# Patient Record
Sex: FEMALE | Race: WHITE | NOT HISPANIC OR LATINO | Employment: FULL TIME | URBAN - METROPOLITAN AREA
[De-identification: names, ages, dates, MRNs, and addresses within clinical notes are randomized per-mention and may not be internally consistent; named-entity substitution may affect disease eponyms.]

---

## 2018-02-14 ASSESSMENT — MIFFLIN-ST. JEOR: SCORE: 1462.08

## 2018-02-15 ASSESSMENT — MIFFLIN-ST. JEOR: SCORE: 1502.91

## 2018-02-19 ENCOUNTER — ANESTHESIA - HEALTHEAST (OUTPATIENT)
Dept: SURGERY | Facility: CLINIC | Age: 68
End: 2018-02-19

## 2018-02-20 ENCOUNTER — SURGERY - HEALTHEAST (OUTPATIENT)
Dept: SURGERY | Facility: CLINIC | Age: 68
End: 2018-02-20

## 2018-02-20 ENCOUNTER — HOME CARE/HOSPICE - HEALTHEAST (OUTPATIENT)
Dept: HOME HEALTH SERVICES | Facility: HOME HEALTH | Age: 68
End: 2018-02-20

## 2018-02-21 ASSESSMENT — MIFFLIN-ST. JEOR: SCORE: 1502.91

## 2019-02-05 ENCOUNTER — AMBULATORY - HEALTHEAST (OUTPATIENT)
Dept: MULTI SPECIALTY CLINIC | Facility: CLINIC | Age: 69
End: 2019-02-05

## 2019-02-05 LAB — HBA1C MFR BLD: 5.8 % (ref 0–5.6)

## 2019-02-06 ENCOUNTER — RECORDS - HEALTHEAST (OUTPATIENT)
Dept: ADMINISTRATIVE | Facility: OTHER | Age: 69
End: 2019-02-06

## 2019-02-21 ASSESSMENT — MIFFLIN-ST. JEOR: SCORE: 1471.16

## 2019-02-25 ENCOUNTER — ANESTHESIA - HEALTHEAST (OUTPATIENT)
Dept: SURGERY | Facility: CLINIC | Age: 69
End: 2019-02-25

## 2019-02-26 ENCOUNTER — SURGERY - HEALTHEAST (OUTPATIENT)
Dept: SURGERY | Facility: CLINIC | Age: 69
End: 2019-02-26

## 2019-02-26 ASSESSMENT — MIFFLIN-ST. JEOR
SCORE: 1453.01
SCORE: 1453.01

## 2021-06-01 VITALS — HEIGHT: 70 IN | BODY MASS INDEX: 28.49 KG/M2 | WEIGHT: 199 LBS

## 2021-06-02 VITALS — BODY MASS INDEX: 29.55 KG/M2 | WEIGHT: 195 LBS | HEIGHT: 68 IN

## 2021-06-16 PROBLEM — M17.9 KNEE OSTEOARTHRITIS: Status: ACTIVE | Noted: 2018-02-20

## 2021-06-16 PROBLEM — Z96.612 STATUS POST TOTAL SHOULDER ARTHROPLASTY, LEFT: Status: ACTIVE | Noted: 2019-02-26

## 2021-06-16 NOTE — ANESTHESIA CARE TRANSFER NOTE
Last vitals:   Vitals:    02/20/18 0854   BP: 122/62   Pulse: 80   Resp: 16   Temp: 36.3  C (97.4  F)   SpO2: 100%     Patient's level of consciousness is drowsy  Spontaneous respirations: yes  Maintains airway independently: yes  Dentition unchanged: yes  Oropharynx: oropharynx clear of all foreign objects    QCDR Measures:  ASA# 20 - Surgical Safety Checklist: WHO surgical safety checklist completed prior to induction  PQRS# 430 - Adult PONV Prevention: 4558F - Pt received => 2 anti-emetic agents (different classes) preop & intraop  ASA# 8 - Peds PONV Prevention: NA - Not pediatric patient, not GA or 2 or more risk factors NOT present  PQRS# 424 - [place]-op Temp Management: 4559F - At least one body temp DOCUMENTED => 35.5C or 95.9F within required timeframe  PQRS# 426 - PACU Transfer Protocol: - Transfer of care checklist used  ASA# 14 - Acute Post-op Pain: ASA14B - Patient did NOT experience pain >= 7 out of 10

## 2021-06-16 NOTE — ANESTHESIA PROCEDURE NOTES
Peripheral Block    Patient location during procedure: pre-op  Start time: 2/20/2018 6:54 AM  End time: 2/20/2018 6:55 AM  post-op analgesia per surgeon order as noted in medical record  Staffing:  Performing  Anesthesiologist: WILFREDO BISWAS IV  Preanesthetic Checklist  Completed: patient identified, site marked, risks, benefits, and alternatives discussed, timeout performed, consent obtained, at patient's request, airway assessed, oxygen available, suction available, emergency drugs available and hand hygiene performed  Peripheral Block  Block type: saphenous  Prep: ChloraPrep  Patient position: supine  Patient monitoring: cardiac monitor, continuous pulse oximetry, heart rate and blood pressure  Laterality: right  Injection technique: ultrasound guided          lidocaine 1% local anesthesia used for local skin prep  Needle  Needle type: echogenic   Needle gauge: 21 G  Needle length: 4 in  no peripheral nerve catheter placed  Assessment  Injection assessment: no difficulty with injection, negative aspiration for heme, no paresthesia on injection and incremental injection

## 2021-06-16 NOTE — ANESTHESIA POSTPROCEDURE EVALUATION
Patient: Stella Guidry  #1 RIGHT TOTAL KNEE ARTHROPLASTY  Anesthesia type: spinal    Patient location: PACU  Last vitals:   Vitals:    02/20/18 1300   BP: 116/68   Pulse: 80   Resp: 16   Temp: 36.6  C (97.9  F)   SpO2: 92%     Post vital signs: stable  Level of consciousness: awake and responds to simple questions  Post-anesthesia pain: pain controlled  Post-anesthesia nausea and vomiting: no  Pulmonary: unassisted, return to baseline  Cardiovascular: stable and blood pressure at baseline  Hydration: adequate  Anesthetic events: no    QCDR Measures:  ASA# 11 - [place]-op Cardiac Arrest: ASA11B - Patient did NOT experience unanticipated cardiac arrest  ASA# 12 - [place]-op Mortality Rate: ASA12B - Patient did NOT die  ASA# 13 - PACU Re-Intubation Rate: ASA13B - Patient did NOT require a new airway mgmt  ASA# 10 - Composite Anes Safety: ASA10A - No serious adverse event    Additional Notes:

## 2021-06-16 NOTE — ANESTHESIA PROCEDURE NOTES
Spinal Block    Patient location during procedure: OR  Start time: 2/20/2018 7:20 AM  End time: 2/20/2018 7:22 AM  Reason for block: primary anesthetic    Staffing:  Performing  Anesthesiologist: WILFREDO BISWAS IV    Preanesthetic Checklist  Completed: patient identified, risks, benefits, and alternatives discussed, timeout performed, consent obtained, at patient's request, airway assessed, oxygen available, suction available, emergency drugs available and hand hygiene performed  Spinal Block  Patient position: sitting  Prep: ChloraPrep  Patient monitoring: heart rate, cardiac monitor, continuous pulse ox and blood pressure  Approach: right paramedian  Location: L4-5  Injection technique: single-shot  Needle type: Quincke   Needle gauge: 25 G

## 2021-06-16 NOTE — ANESTHESIA PROCEDURE NOTES
Peripheral Block    Patient location during procedure: pre-op  Start time: 2/20/2018 6:50 AM  End time: 2/20/2018 6:53 AM  post-op analgesia per surgeon order as noted in medical record  Staffing:  Performing  Anesthesiologist: WILFREDO BISWAS IV  Preanesthetic Checklist  Completed: patient identified, site marked, risks, benefits, and alternatives discussed, timeout performed, consent obtained, at patient's request, airway assessed, oxygen available, suction available, emergency drugs available and hand hygiene performed  Peripheral Block  Block type: brachial plexus  Prep: ChloraPrep  Patient position: left lateral decubitus  Patient monitoring: cardiac monitor, continuous pulse oximetry, heart rate and blood pressure  Laterality: right  Injection technique: ultrasound guided          lidocaine 1% local anesthesia used for local skin prep  Needle  Needle type: echogenic   Needle gauge: 21 G  Needle length: 4 in  no peripheral nerve catheter placed  Assessment  Injection assessment: negative aspiration for heme, no paresthesia on injection, incremental injection and no difficulty with injection

## 2021-06-24 NOTE — ANESTHESIA POSTPROCEDURE EVALUATION
Patient: Stella Guidry  LEFT TOTAL SHOULDER ARTHROPLASTY  Anesthesia type: general    Patient location: PACU  Last vitals:   Vitals:    02/26/19 1150   BP: 143/89   Pulse: 90   Resp: 16   Temp: 36.8  C (98.3  F)   SpO2: 93%     Post vital signs: stable  Level of consciousness: responds to simple questions, responds to stimulation and sleepy  Post-anesthesia pain: pain controlled  Post-anesthesia nausea and vomiting: no  Pulmonary: unassisted, spontaneous ventilation, nasal cannula  Cardiovascular: stable and blood pressure at baseline  Hydration: adequate  Anesthetic events: no    QCDR Measures:  ASA# 11 - [place]-op Cardiac Arrest: ASA11B - Patient did NOT experience unanticipated cardiac arrest  ASA# 12 - [place]-op Mortality Rate: ASA12B - Patient did NOT die  ASA# 13 - PACU Re-Intubation Rate: ASA13B - Patient did NOT require a new airway mgmt  ASA# 10 - Composite Anes Safety: ASA10A - No serious adverse event    Additional Notes: doing well, no pain due to good block

## 2021-06-24 NOTE — ANESTHESIA PROCEDURE NOTES
Peripheral Block    Patient location during procedure: pre-op  Start time: 2/26/2019 7:30 AM  End time: 2/26/2019 7:36 AM  post-op analgesia per surgeon order as noted in medical record  Staffing:  Performing  Anesthesiologist: Boby De Oliveira MD  Preanesthetic Checklist  Completed: patient identified, site marked, risks, benefits, and alternatives discussed, timeout performed, consent obtained, at patient's request, airway assessed, oxygen available, suction available, emergency drugs available and hand hygiene performed  Peripheral Block  Block type: brachial plexus, interscalene  Prep: ChloraPrep  Patient position: supine  Patient monitoring: cardiac monitor, continuous pulse oximetry, heart rate and blood pressure  Laterality: left  Injection technique: ultrasound guided    Ultrasound used to visualize needle placement in proximity to nerve being blocked: yes   Permanent ultrasound image captured for medical record  Sterile gel and probe cover used for ultrasound.    Needle  Needle type: Stimuplex   Needle gauge: 21 G  Needle length: 4 in  no peripheral nerve catheter placed  Assessment  Injection assessment: negative aspiration for heme, no paresthesia on injection, incremental injection and no difficulty with injection

## 2021-06-24 NOTE — ANESTHESIA PREPROCEDURE EVALUATION
Anesthesia Evaluation      Patient summary reviewed   No history of anesthetic complications     Airway   Mallampati: II  Neck ROM: full   Pulmonary - normal exam   (-) COPD, sleep apnea                         Cardiovascular - negative ROS and normal exam  Exercise tolerance: > or = 4 METS  (+) hypertension well controlled, , hypercholesterolemia,     (-) past MI, CAD, CABG/stent, CHF   Neuro/Psych - negative ROS     Endo/Other    (+) diabetes mellitus type 2 well controlled, arthritis, obesity,      GI/Hepatic/Renal    (+) GERD,             Dental    (+) poor dentition                         Anesthesia Plan  Planned anesthetic: peripheral nerve block and general endotracheal  ISB for POP per surgeon request.  Decadron, Zofran.  Diprivan infusion.  Tylenol, Toradol PRN.  ASA 2   Induction: intravenous   Anesthetic plan and risks discussed with: patient  Anesthesia plan special considerations: antiemetics,   Post-op plan: routine recovery      Normal CBC and Chem-8

## 2021-06-24 NOTE — ANESTHESIA CARE TRANSFER NOTE
Last vitals:   Vitals:    02/26/19 1047   BP: 136/71   Pulse: 97   Resp: 16   Temp: 37.2  C (98.9  F)   SpO2: 97%     Patient's level of consciousness is awake and drowsy  Spontaneous respirations: yes  Maintains airway independently: yes  Dentition unchanged: yes  Oropharynx: oropharynx clear of all foreign objects    QCDR Measures:  ASA# 20 - Surgical Safety Checklist: WHO surgical safety checklist completed prior to induction    PQRS# 430 - Adult PONV Prevention: 4558F - Pt received => 2 anti-emetic agents (different classes) preop & intraop  ASA# 8 - Peds PONV Prevention: NA - Not pediatric patient, not GA or 2 or more risk factors NOT present  PQRS# 424 - [place]-op Temp Management: 4559F - At least one body temp DOCUMENTED => 35.5C or 95.9F within required timeframe  PQRS# 426 - PACU Transfer Protocol: - Transfer of care checklist used  ASA# 14 - Acute Post-op Pain: ASA14B - Patient did NOT experience pain >= 7 out of 10

## 2021-06-24 NOTE — ANESTHESIA PROCEDURE NOTES
Peripheral Block    Patient location during procedure: pre-op  Start time: 2/26/2019 7:31 AM  End time: 2/26/2019 7:36 AM  post-op analgesia per surgeon order as noted in medical record  Staffing:  Performing  Anesthesiologist: Boby De Oliveira MD  Preanesthetic Checklist  Completed: patient identified, site marked, risks, benefits, and alternatives discussed, timeout performed, consent obtained, at patient's request, airway assessed, oxygen available, suction available, emergency drugs available and hand hygiene performed  Peripheral Block  Block type: other, superficial cervical plexus  Prep: ChloraPrep  Patient position: supine  Patient monitoring: cardiac monitor, continuous pulse oximetry, blood pressure and heart rate  Laterality: left  Injection technique: ultrasound guided    Ultrasound used to visualize needle placement in proximity to nerve being blocked: yes   Permanent ultrasound image captured for medical record  Sterile gel and probe cover used for ultrasound.    Needle  Needle type: Stimuplex   Needle gauge: 21 G  Needle length: 4 in  no peripheral nerve catheter placed  Assessment  Injection assessment: negative aspiration for heme, no difficulty with injection, no paresthesia on injection and incremental injection

## 2021-12-17 DIAGNOSIS — Z11.59 ENCOUNTER FOR SCREENING FOR OTHER VIRAL DISEASES: ICD-10-CM

## 2022-01-10 ENCOUNTER — LAB (OUTPATIENT)
Dept: LAB | Facility: CLINIC | Age: 72
End: 2022-01-10
Attending: ORTHOPAEDIC SURGERY
Payer: MEDICARE

## 2022-01-10 DIAGNOSIS — Z11.59 ENCOUNTER FOR SCREENING FOR OTHER VIRAL DISEASES: ICD-10-CM

## 2022-01-10 PROCEDURE — U0003 INFECTIOUS AGENT DETECTION BY NUCLEIC ACID (DNA OR RNA); SEVERE ACUTE RESPIRATORY SYNDROME CORONAVIRUS 2 (SARS-COV-2) (CORONAVIRUS DISEASE [COVID-19]), AMPLIFIED PROBE TECHNIQUE, MAKING USE OF HIGH THROUGHPUT TECHNOLOGIES AS DESCRIBED BY CMS-2020-01-R: HCPCS

## 2022-01-10 PROCEDURE — U0005 INFEC AGEN DETEC AMPLI PROBE: HCPCS

## 2022-01-11 ENCOUNTER — ANESTHESIA EVENT (OUTPATIENT)
Dept: SURGERY | Facility: CLINIC | Age: 72
End: 2022-01-11
Payer: MEDICARE

## 2022-01-11 LAB — SARS-COV-2 RNA RESP QL NAA+PROBE: NEGATIVE

## 2022-01-12 ENCOUNTER — HOSPITAL ENCOUNTER (OUTPATIENT)
Facility: CLINIC | Age: 72
Discharge: HOME | End: 2022-01-12
Attending: ORTHOPAEDIC SURGERY | Admitting: ORTHOPAEDIC SURGERY
Payer: MEDICARE

## 2022-01-12 ENCOUNTER — APPOINTMENT (OUTPATIENT)
Dept: RADIOLOGY | Facility: CLINIC | Age: 72
End: 2022-01-12
Attending: PHYSICIAN ASSISTANT
Payer: MEDICARE

## 2022-01-12 ENCOUNTER — ANESTHESIA (OUTPATIENT)
Dept: SURGERY | Facility: CLINIC | Age: 72
End: 2022-01-12
Payer: MEDICARE

## 2022-01-12 VITALS
WEIGHT: 200.3 LBS | SYSTOLIC BLOOD PRESSURE: 141 MMHG | RESPIRATION RATE: 16 BRPM | OXYGEN SATURATION: 95 % | HEART RATE: 84 BPM | DIASTOLIC BLOOD PRESSURE: 70 MMHG | HEIGHT: 69 IN | TEMPERATURE: 97.5 F | BODY MASS INDEX: 29.67 KG/M2

## 2022-01-12 DIAGNOSIS — Z96.611 STATUS POST TOTAL SHOULDER ARTHROPLASTY, RIGHT: Primary | ICD-10-CM

## 2022-01-12 LAB — GLUCOSE BLDC GLUCOMTR-MCNC: 118 MG/DL (ref 70–99)

## 2022-01-12 PROCEDURE — 250N000011 HC RX IP 250 OP 636: Performed by: NURSE ANESTHETIST, CERTIFIED REGISTERED

## 2022-01-12 PROCEDURE — 272N000001 HC OR GENERAL SUPPLY STERILE: Performed by: ORTHOPAEDIC SURGERY

## 2022-01-12 PROCEDURE — 258N000003 HC RX IP 258 OP 636: Performed by: NURSE ANESTHETIST, CERTIFIED REGISTERED

## 2022-01-12 PROCEDURE — 710N000010 HC RECOVERY PHASE 1, LEVEL 2, PER MIN: Performed by: ORTHOPAEDIC SURGERY

## 2022-01-12 PROCEDURE — 250N000011 HC RX IP 250 OP 636: Performed by: ANESTHESIOLOGY

## 2022-01-12 PROCEDURE — 999N000141 HC STATISTIC PRE-PROCEDURE NURSING ASSESSMENT: Performed by: ORTHOPAEDIC SURGERY

## 2022-01-12 PROCEDURE — 250N000013 HC RX MED GY IP 250 OP 250 PS 637: Performed by: PHYSICIAN ASSISTANT

## 2022-01-12 PROCEDURE — C1776 JOINT DEVICE (IMPLANTABLE): HCPCS | Performed by: ORTHOPAEDIC SURGERY

## 2022-01-12 PROCEDURE — 250N000011 HC RX IP 250 OP 636: Performed by: ORTHOPAEDIC SURGERY

## 2022-01-12 PROCEDURE — 999N000065 XR SHOULDER RIGHT PORT G/E 2 VIEWS: Mod: RT

## 2022-01-12 PROCEDURE — 360N000077 HC SURGERY LEVEL 4, PER MIN: Performed by: ORTHOPAEDIC SURGERY

## 2022-01-12 PROCEDURE — 278N000051 HC OR IMPLANT GENERAL: Performed by: ORTHOPAEDIC SURGERY

## 2022-01-12 PROCEDURE — 250N000009 HC RX 250: Performed by: NURSE ANESTHETIST, CERTIFIED REGISTERED

## 2022-01-12 PROCEDURE — 82962 GLUCOSE BLOOD TEST: CPT

## 2022-01-12 PROCEDURE — 258N000001 HC RX 258: Performed by: ORTHOPAEDIC SURGERY

## 2022-01-12 PROCEDURE — 250N000025 HC SEVOFLURANE, PER MIN: Performed by: ORTHOPAEDIC SURGERY

## 2022-01-12 PROCEDURE — 250N000011 HC RX IP 250 OP 636: Performed by: PHYSICIAN ASSISTANT

## 2022-01-12 PROCEDURE — C9290 INJ, BUPIVACAINE LIPOSOME: HCPCS | Performed by: ANESTHESIOLOGY

## 2022-01-12 PROCEDURE — 258N000003 HC RX IP 258 OP 636: Performed by: ANESTHESIOLOGY

## 2022-01-12 PROCEDURE — 710N000012 HC RECOVERY PHASE 2, PER MINUTE: Performed by: ORTHOPAEDIC SURGERY

## 2022-01-12 PROCEDURE — 250N000009 HC RX 250: Performed by: ANESTHESIOLOGY

## 2022-01-12 PROCEDURE — 370N000017 HC ANESTHESIA TECHNICAL FEE, PER MIN: Performed by: ORTHOPAEDIC SURGERY

## 2022-01-12 DEVICE — BONE CEMENT STRK SIMPLEX P SPEEDSET 6192-1-001: Type: IMPLANTABLE DEVICE | Site: SHOULDER | Status: FUNCTIONAL

## 2022-01-12 DEVICE — IMPLANTABLE DEVICE
Type: IMPLANTABLE DEVICE | Site: SHOULDER | Status: FUNCTIONAL
Brand: FLEX SHOULDER SYSTEM

## 2022-01-12 DEVICE — IMPLANTABLE DEVICE
Type: IMPLANTABLE DEVICE | Site: SHOULDER | Status: FUNCTIONAL
Brand: AEQUALIS™ ASCEND™ FLEX

## 2022-01-12 DEVICE — IMPLANTABLE DEVICE
Type: IMPLANTABLE DEVICE | Site: SHOULDER | Status: FUNCTIONAL
Brand: AEQUALIS™ PERFORM

## 2022-01-12 RX ORDER — ACETAMINOPHEN 325 MG/1
650 TABLET ORAL EVERY 4 HOURS PRN
Status: CANCELLED | OUTPATIENT
Start: 2022-01-15

## 2022-01-12 RX ORDER — BISACODYL 10 MG
10 SUPPOSITORY, RECTAL RECTAL DAILY PRN
Status: CANCELLED | OUTPATIENT
Start: 2022-01-12

## 2022-01-12 RX ORDER — OXYCODONE HYDROCHLORIDE 5 MG/1
5 TABLET ORAL EVERY 4 HOURS PRN
Refills: 0
Start: 2022-01-12

## 2022-01-12 RX ORDER — SODIUM CHLORIDE, SODIUM LACTATE, POTASSIUM CHLORIDE, CALCIUM CHLORIDE 600; 310; 30; 20 MG/100ML; MG/100ML; MG/100ML; MG/100ML
INJECTION, SOLUTION INTRAVENOUS CONTINUOUS
Status: CANCELLED | OUTPATIENT
Start: 2022-01-12

## 2022-01-12 RX ORDER — FENTANYL CITRATE 50 UG/ML
25 INJECTION, SOLUTION INTRAMUSCULAR; INTRAVENOUS EVERY 5 MIN PRN
Status: DISCONTINUED | OUTPATIENT
Start: 2022-01-12 | End: 2022-01-12 | Stop reason: HOSPADM

## 2022-01-12 RX ORDER — DEXAMETHASONE SODIUM PHOSPHATE 10 MG/ML
INJECTION, SOLUTION INTRAMUSCULAR; INTRAVENOUS PRN
Status: DISCONTINUED | OUTPATIENT
Start: 2022-01-12 | End: 2022-01-12

## 2022-01-12 RX ORDER — CEFAZOLIN SODIUM 2 G/100ML
2 INJECTION, SOLUTION INTRAVENOUS EVERY 8 HOURS
Status: DISCONTINUED | OUTPATIENT
Start: 2022-01-12 | End: 2022-01-12 | Stop reason: HOSPADM

## 2022-01-12 RX ORDER — ACETAMINOPHEN 500 MG
1000 TABLET ORAL EVERY 6 HOURS PRN
COMMUNITY

## 2022-01-12 RX ORDER — PROPOFOL 10 MG/ML
INJECTION, EMULSION INTRAVENOUS PRN
Status: DISCONTINUED | OUTPATIENT
Start: 2022-01-12 | End: 2022-01-12

## 2022-01-12 RX ORDER — ONDANSETRON 2 MG/ML
4 INJECTION INTRAMUSCULAR; INTRAVENOUS EVERY 6 HOURS PRN
Status: CANCELLED | OUTPATIENT
Start: 2022-01-12

## 2022-01-12 RX ORDER — ONDANSETRON 4 MG/1
4 TABLET, ORALLY DISINTEGRATING ORAL EVERY 6 HOURS PRN
Status: CANCELLED | OUTPATIENT
Start: 2022-01-12

## 2022-01-12 RX ORDER — BUPIVACAINE HYDROCHLORIDE 5 MG/ML
INJECTION, SOLUTION EPIDURAL; INTRACAUDAL
Status: COMPLETED | OUTPATIENT
Start: 2022-01-12 | End: 2022-01-12

## 2022-01-12 RX ORDER — HYDROMORPHONE HCL IN WATER/PF 6 MG/30 ML
0.4 PATIENT CONTROLLED ANALGESIA SYRINGE INTRAVENOUS
Status: CANCELLED | OUTPATIENT
Start: 2022-01-12

## 2022-01-12 RX ORDER — OXYCODONE HYDROCHLORIDE 5 MG/1
5 TABLET ORAL EVERY 4 HOURS PRN
Status: DISCONTINUED | OUTPATIENT
Start: 2022-01-12 | End: 2022-01-12 | Stop reason: HOSPADM

## 2022-01-12 RX ORDER — VANCOMYCIN HYDROCHLORIDE 1 G/20ML
INJECTION, POWDER, LYOPHILIZED, FOR SOLUTION INTRAVENOUS PRN
Status: DISCONTINUED | OUTPATIENT
Start: 2022-01-12 | End: 2022-01-12 | Stop reason: HOSPADM

## 2022-01-12 RX ORDER — ASPIRIN 81 MG/1
81 TABLET ORAL EVERY 12 HOURS
Start: 2022-01-12

## 2022-01-12 RX ORDER — ONDANSETRON 2 MG/ML
INJECTION INTRAMUSCULAR; INTRAVENOUS PRN
Status: DISCONTINUED | OUTPATIENT
Start: 2022-01-12 | End: 2022-01-12

## 2022-01-12 RX ORDER — MAGNESIUM SULFATE 4 G/50ML
4 INJECTION INTRAVENOUS ONCE
Status: COMPLETED | OUTPATIENT
Start: 2022-01-12 | End: 2022-01-12

## 2022-01-12 RX ORDER — ONDANSETRON 2 MG/ML
4 INJECTION INTRAMUSCULAR; INTRAVENOUS EVERY 30 MIN PRN
Status: DISCONTINUED | OUTPATIENT
Start: 2022-01-12 | End: 2022-01-12 | Stop reason: HOSPADM

## 2022-01-12 RX ORDER — LIDOCAINE 40 MG/G
CREAM TOPICAL
Status: CANCELLED | OUTPATIENT
Start: 2022-01-12

## 2022-01-12 RX ORDER — HYDROMORPHONE HCL IN WATER/PF 6 MG/30 ML
0.2 PATIENT CONTROLLED ANALGESIA SYRINGE INTRAVENOUS
Status: CANCELLED | OUTPATIENT
Start: 2022-01-12

## 2022-01-12 RX ORDER — POLYETHYLENE GLYCOL 3350 17 G/17G
17 POWDER, FOR SOLUTION ORAL DAILY
Status: CANCELLED | OUTPATIENT
Start: 2022-01-13

## 2022-01-12 RX ORDER — ASPIRIN 81 MG/1
81 TABLET ORAL AT BEDTIME
Status: ON HOLD | COMMUNITY
End: 2022-01-12

## 2022-01-12 RX ORDER — LIDOCAINE 40 MG/G
CREAM TOPICAL
Status: DISCONTINUED | OUTPATIENT
Start: 2022-01-12 | End: 2022-01-12 | Stop reason: HOSPADM

## 2022-01-12 RX ORDER — SODIUM CHLORIDE, SODIUM LACTATE, POTASSIUM CHLORIDE, CALCIUM CHLORIDE 600; 310; 30; 20 MG/100ML; MG/100ML; MG/100ML; MG/100ML
INJECTION, SOLUTION INTRAVENOUS CONTINUOUS
Status: DISCONTINUED | OUTPATIENT
Start: 2022-01-12 | End: 2022-01-12 | Stop reason: HOSPADM

## 2022-01-12 RX ORDER — ACETAMINOPHEN 325 MG/1
975 TABLET ORAL EVERY 8 HOURS
Status: DISCONTINUED | OUTPATIENT
Start: 2022-01-12 | End: 2022-01-12 | Stop reason: HOSPADM

## 2022-01-12 RX ORDER — HYDROMORPHONE HCL IN WATER/PF 6 MG/30 ML
0.2 PATIENT CONTROLLED ANALGESIA SYRINGE INTRAVENOUS EVERY 5 MIN PRN
Status: DISCONTINUED | OUTPATIENT
Start: 2022-01-12 | End: 2022-01-12 | Stop reason: HOSPADM

## 2022-01-12 RX ORDER — CEFAZOLIN SODIUM 2 G/100ML
2 INJECTION, SOLUTION INTRAVENOUS
Status: DISCONTINUED | OUTPATIENT
Start: 2022-01-12 | End: 2022-01-12 | Stop reason: HOSPADM

## 2022-01-12 RX ORDER — PROCHLORPERAZINE MALEATE 5 MG
5 TABLET ORAL EVERY 6 HOURS PRN
Status: CANCELLED | OUTPATIENT
Start: 2022-01-12

## 2022-01-12 RX ORDER — NITROFURANTOIN 25; 75 MG/1; MG/1
100 CAPSULE ORAL PRN
COMMUNITY
Start: 2021-08-13

## 2022-01-12 RX ORDER — ONDANSETRON 4 MG/1
4 TABLET, ORALLY DISINTEGRATING ORAL EVERY 30 MIN PRN
Status: DISCONTINUED | OUTPATIENT
Start: 2022-01-12 | End: 2022-01-12 | Stop reason: HOSPADM

## 2022-01-12 RX ORDER — PROPOFOL 10 MG/ML
INJECTION, EMULSION INTRAVENOUS CONTINUOUS PRN
Status: DISCONTINUED | OUTPATIENT
Start: 2022-01-12 | End: 2022-01-12

## 2022-01-12 RX ORDER — GLYCINE 1.5 G/100ML
SOLUTION IRRIGATION PRN
Status: DISCONTINUED | OUTPATIENT
Start: 2022-01-12 | End: 2022-01-12 | Stop reason: HOSPADM

## 2022-01-12 RX ORDER — OXYCODONE HYDROCHLORIDE 5 MG/1
10 TABLET ORAL EVERY 4 HOURS PRN
Status: DISCONTINUED | OUTPATIENT
Start: 2022-01-12 | End: 2022-01-12 | Stop reason: HOSPADM

## 2022-01-12 RX ORDER — CALCIUM CARBONATE 500(1250)
1 TABLET ORAL DAILY
COMMUNITY

## 2022-01-12 RX ORDER — AMOXICILLIN 250 MG
1 CAPSULE ORAL 2 TIMES DAILY
Status: CANCELLED | OUTPATIENT
Start: 2022-01-12

## 2022-01-12 RX ORDER — ASPIRIN 81 MG/1
81 TABLET ORAL 2 TIMES DAILY
Status: CANCELLED | OUTPATIENT
Start: 2022-01-12

## 2022-01-12 RX ORDER — CEFAZOLIN SODIUM 2 G/100ML
2 INJECTION, SOLUTION INTRAVENOUS SEE ADMIN INSTRUCTIONS
Status: DISCONTINUED | OUTPATIENT
Start: 2022-01-12 | End: 2022-01-12 | Stop reason: HOSPADM

## 2022-01-12 RX ORDER — TRANEXAMIC ACID 650 MG/1
1950 TABLET ORAL ONCE
Status: COMPLETED | OUTPATIENT
Start: 2022-01-12 | End: 2022-01-12

## 2022-01-12 RX ORDER — VANCOMYCIN HYDROCHLORIDE 1 G/20ML
1 INJECTION, POWDER, LYOPHILIZED, FOR SOLUTION INTRAVENOUS ONCE
Status: DISCONTINUED | OUTPATIENT
Start: 2022-01-12 | End: 2022-01-12 | Stop reason: HOSPADM

## 2022-01-12 RX ORDER — FENTANYL CITRATE 50 UG/ML
50 INJECTION, SOLUTION INTRAMUSCULAR; INTRAVENOUS
Status: COMPLETED | OUTPATIENT
Start: 2022-01-12 | End: 2022-01-12

## 2022-01-12 RX ORDER — CLOTRIMAZOLE 1 %
CREAM (GRAM) TOPICAL 2 TIMES DAILY PRN
COMMUNITY
Start: 2021-09-01

## 2022-01-12 RX ADMIN — BUPIVACAINE 10 ML: 13.3 INJECTION, SUSPENSION, LIPOSOMAL INFILTRATION at 06:57

## 2022-01-12 RX ADMIN — BUPIVACAINE HYDROCHLORIDE 12 ML: 5 INJECTION, SOLUTION EPIDURAL; INTRACAUDAL; PERINEURAL at 06:57

## 2022-01-12 RX ADMIN — BUPIVACAINE HYDROCHLORIDE 3 ML: 5 INJECTION, SOLUTION EPIDURAL; INTRACAUDAL at 06:59

## 2022-01-12 RX ADMIN — DEXAMETHASONE SODIUM PHOSPHATE 10 MG: 10 INJECTION, SOLUTION INTRAMUSCULAR; INTRAVENOUS at 07:31

## 2022-01-12 RX ADMIN — FENTANYL CITRATE 50 MCG: 50 INJECTION INTRAMUSCULAR; INTRAVENOUS at 06:55

## 2022-01-12 RX ADMIN — PROPOFOL 100 MCG/KG/MIN: 10 INJECTION, EMULSION INTRAVENOUS at 07:45

## 2022-01-12 RX ADMIN — PHENYLEPHRINE HYDROCHLORIDE 100 MCG: 10 INJECTION INTRAVENOUS at 07:42

## 2022-01-12 RX ADMIN — ROCURONIUM BROMIDE 50 MG: 10 INJECTION, SOLUTION INTRAVENOUS at 07:31

## 2022-01-12 RX ADMIN — FENTANYL CITRATE 100 MCG: 50 INJECTION INTRAMUSCULAR; INTRAVENOUS at 07:31

## 2022-01-12 RX ADMIN — PHENYLEPHRINE HYDROCHLORIDE 100 MCG: 10 INJECTION INTRAVENOUS at 08:03

## 2022-01-12 RX ADMIN — CEFAZOLIN SODIUM 2 G: 2 INJECTION, SOLUTION INTRAVENOUS at 07:26

## 2022-01-12 RX ADMIN — TRANEXAMIC ACID 1950 MG: 650 TABLET ORAL at 06:12

## 2022-01-12 RX ADMIN — PHENYLEPHRINE HYDROCHLORIDE 100 MCG: 10 INJECTION INTRAVENOUS at 07:48

## 2022-01-12 RX ADMIN — MAGNESIUM SULFATE HEPTAHYDRATE 4 G: 80 INJECTION, SOLUTION INTRAVENOUS at 06:20

## 2022-01-12 RX ADMIN — PHENYLEPHRINE HYDROCHLORIDE 0.5 MCG/KG/MIN: 10 INJECTION INTRAVENOUS at 08:07

## 2022-01-12 RX ADMIN — MIDAZOLAM HYDROCHLORIDE 1 MG: 1 INJECTION, SOLUTION INTRAMUSCULAR; INTRAVENOUS at 06:55

## 2022-01-12 RX ADMIN — SUGAMMADEX 200 MG: 100 INJECTION, SOLUTION INTRAVENOUS at 09:43

## 2022-01-12 RX ADMIN — ONDANSETRON 4 MG: 2 INJECTION INTRAMUSCULAR; INTRAVENOUS at 09:20

## 2022-01-12 RX ADMIN — PROPOFOL 30 MG: 10 INJECTION, EMULSION INTRAVENOUS at 09:26

## 2022-01-12 RX ADMIN — SODIUM CHLORIDE, POTASSIUM CHLORIDE, SODIUM LACTATE AND CALCIUM CHLORIDE: 600; 310; 30; 20 INJECTION, SOLUTION INTRAVENOUS at 06:13

## 2022-01-12 RX ADMIN — PROPOFOL 150 MG: 10 INJECTION, EMULSION INTRAVENOUS at 07:31

## 2022-01-12 RX ADMIN — SODIUM CHLORIDE, POTASSIUM CHLORIDE, SODIUM LACTATE AND CALCIUM CHLORIDE: 600; 310; 30; 20 INJECTION, SOLUTION INTRAVENOUS at 08:05

## 2022-01-12 RX ADMIN — PHENYLEPHRINE HYDROCHLORIDE 200 MCG: 10 INJECTION INTRAVENOUS at 08:07

## 2022-01-12 RX ADMIN — CEFAZOLIN SODIUM 2 G: 2 INJECTION, SOLUTION INTRAVENOUS at 13:11

## 2022-01-12 RX ADMIN — PHENYLEPHRINE HYDROCHLORIDE 200 MCG: 10 INJECTION INTRAVENOUS at 08:18

## 2022-01-12 ASSESSMENT — MIFFLIN-ST. JEOR: SCORE: 1492.93

## 2022-01-12 NOTE — OP NOTE
DATE OF SERVICE: 1/12/2022     PREOPERATIVE DIAGNOSIS: Osteoarthritis of right shoulder [M19.011]     POSTOPERATIVE DIAGNOSIS: Same    PROCEDURE: Procedure(s):  RIGHT TOTAL SHOULDER ARTHROPLASTY     IMPLANTS:   Implant Name Type Inv. Item Serial No.  Lot No. LRB No. Used Action   BONE CEMENT STRK SIMPLEX P SPEEDSET 6192-1-001 - [id number] Cement, Bone BONE CEMENT STRK SIMPLEX P SPEEDSET 6192-1-001  [employer] [id number] Right 1 Implanted   GLENOID CORTILOC MD 40MM - [id number] Total Joint Component/Insert GLENOID CORTILOC MD 40MM [id number] [employer]  Right 1 Implanted   STEM HUMERAL ANATOMIC STD PTC 3B - [id number] Total Joint Component/Insert STEM HUMERAL ANATOMIC STD PTC 3B [id number] [employer]  Right 1 Implanted   HEAD HUMERAL HIGH OFFSET 19E68HK - [id number] Total Joint Component/Insert HEAD HUMERAL HIGH OFFSET 27C34GI [id number] [employer]  Right 1 Implanted        FINDINGS: Severe right shoulder glenohumeral degenerative arthritis with osteophytic spurring.  Intact rotator cuff tendons.    SURGEON: MATY MONREAL MD     ASSISTANT: Lillian Park    INDICATIONS: The patient is a 71-year-old normally healthy individuals have persistent and progressively worsening right shoulder pain present for the past few years time.  She had undergone a successful left total shoulder arthroplasty and after having reviewed the risk and benefits elected to proceed with a right total shoulder arthroplasty.  The normal postop course was discussed in detail preoperatively.    PROCEDURE: After informed written consent was obtained the patient underwent successful placement of  shoulder interscalene block and then was brought to the OR where the patient underwent successful induction with general anesthesia.  The patient was carefully placed in low beachchair position taking care to properly position the head neck.  The shoulder was sterilely prepped and draped in usual fashion.  IV antibiotics were  given.    After an appropriate pause for the cause with identification of the operative limb a deltopectoral approach was made with sharp dissection being carried down through  the skin and subcutaneous tissue.  The deltopectoral interval was identified the cephalic vein was retracted and Bankart retractor was placed beneath the conjoined tendon and the deltoid.  The biceps tendon was identified.  The bicipital groove was followed up to the rotator interval and split longitudinally.  The subscapularis was then taken down after being tagged with 2 #2 fiber wires in a vertical mattress fashion.  Subscapularis takedown allowed for exposure of the severely worn glenohumeral joint.  The anterior circumflex vessels were identified ligated and transected.  The humeral head was delivered anteriorly after a capsular release was performed.  Again care was taken throughout the procedure to avoid the axillary nerve inferiorly and additional neuro- vasculature.  Once the humeral head was delivered anteriorly and and circumferential osteophytes debrided a drill hole was made just superior and posterior to the bicipital groove.  This allowed for the cutting guide to be placed without difficulty.  The cutting guide was then positioned for resection of the proximal humerus taking care to dial in the appropriate degree of version between 20 and 30 .  Once the proximal humerus was cut circumferential osteophytes were removed further.  Progressive reaming and broaching was then performed up to the appropriate sized stem.  A proximal humeral protecting disc was applied and attention then was directed towards the glenoid.    The glenoid was exposed with a Fukuda retractor placed posteriorly and a Batman retractor anteriorly.  Pointed Hohmann was placed superiorly.  After the biceps was released and a circumferential labral resection performed a Lawson was used to remove any remnant articular cartilage along the glenoid.  Hash marks were  drawn on the center of the glenoid to allow for preparation with our reamers.  After the initial center hole was drilled reaming up to a flush flat surface was performed.  Then the central hole was widened to allow for the pegged glenoid to be placed.  Appropriate size glenoid trial was applied after the center peg and 3 peripheral peg holes were drilled.  Flush fit was noted.    Once the appropriate head size was chosen with the appropriate degree of offset this was provisionally applied to the proximal humerus.  Trial reduction showed good range of motion in all planes as well as excellent stability.      Having chosen the appropriate size glenoid  copious jet lavage irrigation of the glenoid was followed by placing cement in the peripheral peg holes under compression with a syringe.  Next the glenoid was then impacted into place and held during cement hardening.    At this point the trial components were removed from the proximal humerus..  Drill holes were made in the proximal humerus at the level of the bicipital groove and 4 #2 FiberWire sutures placed for later repair of the subscapularis tendon.    After copious irrigation the appropriate size stem was then impacted into place.  Trial reduction was once again performed.  Again the appropriate head size was chosen and impacted into place.  No significant changes were noted in stability or range of motion after placement of the final components.    The shoulder was then copiously irrigated with further antibiotic solution.  Irrisept was also used to irrigate the joint.  Total volume 1 L.  One gram of vancomycin powder was placed in the wound in patients without an allergy. A meticulous repair of the subscap back to the lesser tuberosity was performed with the 4 #2 FiberWire sutures placed through the previously mentioned drill holes utilizing modified Ky-Jose stitches.  This was then reinforced with the 2 #2 FiberWire initially placed in the subscapularis  tendon.    The biceps tendon had been tenodesed to the proximal portion of the pec tendon insertion.  Further irrigation was then followed by closure of the deltopectoral interval with 0 Vicryl reinforced with 2-0 Vicryl and subcutaneous tissue 2-0 Vicryl and skin with Monocryl.  Sterile dressing was applied.  There were no known complications.  Sponge and needle counts were reported as correct ×2.  Estimated blood loss was 50 cc.  The patient was transferred to [facility] for recovery.    Adrien Isbell MD

## 2022-01-12 NOTE — ANESTHESIA POSTPROCEDURE EVALUATION
Patient: Stella Guidry    Procedure: Procedure(s):  RIGHT TOTAL SHOULDER ARTHROPLASTY       Diagnosis:Osteoarthritis of right shoulder [M19.011]  Diagnosis Additional Information: No value filed.    Anesthesia Type:  General    Note:     Postop Pain Control: Uneventful            Sign Out: Well controlled pain   PONV: No   Neuro/Psych: Uneventful            Sign Out: Acceptable/Baseline neuro status   Airway/Respiratory: Uneventful            Sign Out: Acceptable/Baseline resp. status   CV/Hemodynamics: Uneventful            Sign Out: Acceptable CV status; No obvious hypovolemia; No obvious fluid overload   Other NRE: NONE   DID A NON-ROUTINE EVENT OCCUR? No           Last vitals:  Vitals Value Taken Time   /81 01/12/22 1040   Temp 36.2  C (97.2  F) 01/12/22 1030   Pulse 71 01/12/22 1048   Resp 21 01/12/22 1048   SpO2 97 % 01/12/22 1047   Vitals shown include unvalidated device data.    Electronically Signed By: Cedric King MD  January 12, 2022  3:40 PM

## 2022-01-12 NOTE — ANESTHESIA PREPROCEDURE EVALUATION
Anesthesia Pre-Procedure Evaluation    Patient: Stella Guidry   MRN: [id number] : 1950        Preoperative Diagnosis: Osteoarthritis of right shoulder [M19.011]    Procedure : Procedure(s):  RIGHT TOTAL SHOULDER ARTHROPLASTY          Past Medical History:   Diagnosis Date     Diabetes (H)      Gastroesophageal reflux disease      Hypertension       Past Surgical History:   Procedure Laterality Date     ARTHROSCOPY KNEE       BREAST SURGERY      bx     JOINT REPLACEMENT Bilateral     knee     ZZC RECONSTR TOTAL SHOULDER IMPLANT Left 2019    Procedure: LEFT TOTAL SHOULDER ARTHROPLASTY;  Surgeon: Adrien Isbell MD;  Location: [facility];  Service: Orthopedics     ZC TOTAL KNEE ARTHROPLASTY Right 2018    Procedure: #1 RIGHT TOTAL KNEE ARTHROPLASTY;  Surgeon: Artem Gonzalez MD;  Location: [facility];  Service: Orthopedics      No Known Allergies   Social History     Tobacco Use     Smoking status: Former Smoker     Smokeless tobacco: Never Used   Substance Use Topics     Alcohol use: Yes     Alcohol/week: 4.0 standard drinks     Comment: Alcoholic Drinks/day: a week      Wt Readings from Last 1 Encounters:   22 90.9 kg (200 lb 4.8 oz)        Anesthesia Evaluation   Pt has had prior anesthetic.     No history of anesthetic complications       ROS/MED HX  ENT/Pulmonary:  - neg pulmonary ROS     Neurologic: Comment: Denies neuropathy or C-spine disease      Cardiovascular:     (+) hypertension-----    METS/Exercise Tolerance:     Hematologic:  - neg hematologic  ROS     Musculoskeletal:       GI/Hepatic:     (+) GERD (controlled),     Renal/Genitourinary:  - neg Renal ROS     Endo:     (+) type II DM,     Psychiatric/Substance Use:       Infectious Disease:       Malignancy:       Other:            Physical Exam    Airway        Mallampati: II   TM distance: > 3 FB      Respiratory Devices and Support         Dental  no notable dental history         Cardiovascular           Rhythm and rate: regular and normal     Pulmonary           breath sounds clear to auscultation           OUTSIDE LABS:  CBC:   Lab Results   Component Value Date    HGB 10.3 (L) 02/27/2019    HGB 10.2 (L) 02/21/2018     BMP:   Lab Results   Component Value Date     (H) 01/12/2022     02/27/2019     COAGS: No results found for: PTT, INR, FIBR  POC: No results found for: BGM, HCG, HCGS  HEPATIC:   Lab Results   Component Value Date    PROTTOTAL 7.1 02/26/2016     OTHER:   Lab Results   Component Value Date    A1C 6.0 02/21/2018       Anesthesia Plan    ASA Status:  2      Anesthesia Type: General.     - Airway: ETT   Induction: Intravenous.   Maintenance: TIVA.        Consents    Anesthesia Plan(s) and associated risks, benefits, and realistic alternatives discussed. Questions answered and patient/representative(s) expressed understanding.     - Discussed: Risks, Benefits and Alternatives for BOTH SEDATION and the PROCEDURE were discussed     - Discussed with:  Patient         Postoperative Care    Pain management: Peripheral nerve block (Single Shot).   PONV prophylaxis: Ondansetron (or other 5HT-3), Dexamethasone or Solumedrol     Comments:                Cedric King MD

## 2022-01-12 NOTE — ANESTHESIA PROCEDURE NOTES
Cervical Plexus (superficial) Procedure Note    Pre-Procedure   Staff -        Anesthesiologist:  Cedric King MD       Performed By: anesthesiologist       Location: pre-op       Procedure Start/Stop Times: 1/12/2022 6:57 AM and 1/12/2022 6:59 AM       Pre-Anesthestic Checklist: patient identified, IV checked, site marked, risks and benefits discussed, informed consent, monitors and equipment checked, pre-op evaluation, at physician/surgeon's request and post-op pain management  Timeout:       Correct Patient: Yes        Correct Procedure: Yes        Correct Site: Yes        Correct Position: Yes        Correct Laterality: Yes        Site Marked: Yes  Procedure Documentation  Procedure: Cervical Plexus (superficial)       Laterality: right       Patient Position: supine       Patient Prep/Sterile Barriers: sterile gloves, mask       Skin prep: Chloraprep       Needle Type: short bevel       Needle Gauge: 20.        Needle Length (Inches): 4        Ultrasound guided       1. Ultrasound was used to identify targeted nerve, plexus, vascular marker, or fascial plane and place a needle adjacent to it in real-time.       2. Ultrasound was used to visualize the spread of anesthetic in close proximity to the above referenced structure.       3. A permanent image is entered into the patient's record.       4. The visualized anatomic structures appeared normal.       5. There were no apparent abnormal pathologic findings.    Assessment/Narrative         The placement was negative for: blood aspirated, painful injection and site bleeding       Paresthesias: No.      Bolus given via needle. No blood aspirated via catheter.        Secured via.        Insertion/Infusion Method: Single Shot       Complications: none    Medication(s) Administered   Bupivacaine 0.5% PF (Infiltration), 3 mL  Medication Administration Time: 1/12/2022 6:59 AM

## 2022-01-12 NOTE — ANESTHESIA PROCEDURE NOTES
Airway       Patient location during procedure: OR       Procedure Start/Stop Times: 1/12/2022 7:37 AM  Staff -        Performed By: CRNA  Consent for Airway        Urgency: elective  Indications and Patient Condition       Indications for airway management: holly-procedural and airway protection       Induction type:intravenous       Mask difficulty assessment: 1 - vent by mask    Final Airway Details       Final airway type: endotracheal airway       Successful airway: ETT - single  Endotracheal Airway Details        ETT size (mm): 7.0       Cuffed: yes       Successful intubation technique: direct laryngoscopy       DL Blade Type: Altman 3       Adjucts: tooth guard       Position: Left       Measured from: lips       Secured at (cm): 23       Bite block used: None    Post intubation assessment        Placement verified by: capnometry and equal breath sounds        Number of attempts at approach: 1       Secured with: silk tape       Ease of procedure: easy       Dentition: Intact and Unchanged

## 2022-01-12 NOTE — ANESTHESIA CARE TRANSFER NOTE
Patient: Stella Guidry    Procedure: Procedure(s):  RIGHT TOTAL SHOULDER ARTHROPLASTY       Diagnosis: Osteoarthritis of right shoulder [M19.011]  Diagnosis Additional Information: No value filed.    Anesthesia Type:   General     Note:    Oropharynx: oropharynx clear of all foreign objects and spontaneously breathing  Level of Consciousness: drowsy  Oxygen Supplementation: face mask  Level of Supplemental Oxygen (L/min / FiO2): 8  Independent Airway: airway patency satisfactory and stable  Dentition: dentition unchanged  Vital Signs Stable: post-procedure vital signs reviewed and stable  Report to RN Given: handoff report given  Patient transferred to: PACU    Handoff Report: Set expectations for post-procedure period      Vitals:  Vitals Value Taken Time   /72 01/12/22 0955   Temp 36.8  C (98.2  F) 01/12/22 0955   Pulse 74 01/12/22 0957   Resp 15 01/12/22 0957   SpO2 96 % 01/12/22 0957   Vitals shown include unvalidated device data.    Electronically Signed By: ЮЛИЯ Mathis CRNA  January 12, 2022  9:58 AM

## 2022-01-12 NOTE — ANESTHESIA PROCEDURE NOTES
Interscalene Procedure Note    Pre-Procedure   Staff -        Anesthesiologist:  Cedric King MD       Performed By: anesthesiologist       Location: pre-op       Procedure Start/Stop Times: 1/12/2022 6:55 AM and 1/12/2022 6:57 AM       Pre-Anesthestic Checklist: patient identified, IV checked, site marked, risks and benefits discussed, informed consent, monitors and equipment checked, pre-op evaluation, at physician/surgeon's request and post-op pain management  Timeout:       Correct Patient: Yes        Correct Procedure: Yes        Correct Site: Yes        Correct Position: Yes        Correct Laterality: Yes        Site Marked: Yes  Procedure Documentation  Procedure: Interscalene       Laterality: right       Patient Position: supine       Patient Prep/Sterile Barriers: sterile gloves, mask       Skin prep: Chloraprep       Needle Type: short bevel       Needle Gauge: 20.        Needle Length (Inches): 4        Ultrasound guided       1. Ultrasound was used to identify targeted nerve, plexus, vascular marker, or fascial plane and place a needle adjacent to it in real-time.       2. Ultrasound was used to visualize the spread of anesthetic in close proximity to the above referenced structure.       3. A permanent image is entered into the patient's record.       4. The visualized anatomic structures appeared normal.       5. There were no apparent abnormal pathologic findings.    Assessment/Narrative         The placement was negative for: blood aspirated, painful injection and site bleeding       Paresthesias: No.      Bolus given via needle. No blood aspirated via catheter.        Secured via.        Insertion/Infusion Method: Single Shot       Complications: none       Injection made incrementally with aspirations every 5 mL.    Medication(s) Administered   Bupivacaine 0.5% PF (Infiltration), 12 mL  Bupivacaine liposome (Exparel) 1.3% LA inj susp (Infiltration), 10 mL  Medication Administration  Time: 1/12/2022 6:57 AM

## 2022-01-12 NOTE — PHARMACY-ADMISSION MEDICATION HISTORY
Pharmacy Note - Admission Medication History    Pertinent Provider Information: N/A   ______________________________________________________________________    Prior To Admission (PTA) med list completed and updated in EMR.       PTA Med List   Medication Sig Last Dose     acetaminophen (TYLENOL) 500 MG tablet Take 1,000 mg by mouth every 6 hours as needed for mild pain 1/11/2022 at PM     aspirin 81 MG EC tablet Take 81 mg by mouth At Bedtime 1/5/2022 at PM     atenolol (TENORMIN) 25 MG tablet [ATENOLOL (TENORMIN) 25 MG TABLET] Take 25 mg by mouth at bedtime.  1/11/2022 at PM     b complex vitamins tablet [B COMPLEX VITAMINS TABLET] Take 1 tablet by mouth daily. 1/5/2022     calcium carbonate (OS-JAYJAY) 500 MG tablet Take 1 tablet by mouth daily 1/5/2022     cholecalciferol 25 MCG (1000 UT) TABS Take 1 tablet by mouth daily 1/5/2022     clotrimazole (LOTRIMIN) 1 % external cream Apply topically 2 times daily as needed Unknown at Unknown time     coenzyme Q10 (CO Q-10) 300 mg cap [COENZYME Q10 (CO Q-10) 300 MG CAP] Take 1 capsule by mouth daily. 1/5/2022     GLUCOSAMINE/METHYLSULFONYLMETH (GLUCOSAMINE MSM ORAL) [GLUCOSAMINE/METHYLSULFONYLMETH (GLUCOSAMINE MSM ORAL)] Take 2 capsules by mouth at bedtime. 1/5/2022     losartan-hydrochlorothiazide (HYZAAR) 50-12.5 mg per tablet [LOSARTAN-HYDROCHLOROTHIAZIDE (HYZAAR) 50-12.5 MG PER TABLET] Take 1 tablet by mouth daily. 1/11/2022 at AM     metFORMIN (GLUCOPHAGE) 500 MG tablet [METFORMIN (GLUCOPHAGE) 500 MG TABLET] Take 500 mg by mouth 2 (two) times a day with meals. 1/11/2022 at PM     multivitamin therapeutic tablet [MULTIVITAMIN THERAPEUTIC TABLET] Take 1 tablet by mouth daily. 1/5/2022     nitroFURantoin macrocrystal-monohydrate (MACROBID) 100 MG capsule Take 100 mg by mouth as needed Take 30 min before sexual activity. Unknown at Unknown time     omeprazole (PRILOSEC) 20 MG capsule Take 20 mg by mouth daily  1/11/2022 at AM     oxybutynin (DITROPAN XL) 5 MG ER tablet  [OXYBUTYNIN (DITROPAN XL) 5 MG ER TABLET] Take 5 mg by mouth daily with lunch.  1/11/2022 at Unknown time     simvastatin (ZOCOR) 40 MG tablet [SIMVASTATIN (ZOCOR) 40 MG TABLET] Take 40 mg by mouth at bedtime. 1/11/2022 at PM     sulfacetamide sodium 10 % Susp [SULFACETAMIDE SODIUM 10 % SUSP] Apply 1 application topically daily as needed (to face).        Unknown at Unknown time     tretinoin (RETIN-A) 0.025 % cream [TRETINOIN (RETIN-A) 0.025 % CREAM] Apply 1 application topically at bedtime as needed.  Unknown at Unknown time     vitamin E 400 unit capsule [VITAMIN E 400 UNIT CAPSULE] Take 800 Units by mouth daily. 1/5/2022       Information source(s): Patient and Clinic records    Method of interview communication: in-person    Patient was asked about OTC/herbal products specifically.  PTA med list reflects this.    Based on the pharmacist's assessment, the PTA med list information appears reliable    Allergies were reviewed, assessed, and updated with the patient.      Patient does not anticipate needing any multi-use medications during admission.     Thank you for the opportunity to participate in the care of this patient.      Altaf Mariscal [facility]     1/12/2022     6:43 AM

## 2022-01-13 NOTE — PROGRESS NOTES
Surgeon: Called patient at [address]  [facility]: [facility]  Name of Surgery: right total shoulder arthroplasty  Date of Surgery: 1/12/2022    Hi,  I am a registerednurse calling from "[employer]" to check in and see how you are doing following your joint replacement surgery today.    1) Is your pain level manageable? If not, have you been taking your pain medications asprescribed and have you tried icing and elevating your surgical extremity?   2) Are you having any new or increased numbness or tingling in your surgical leg?   3) Are you having increased swelling around yoursurgical site?   4) Are you having any drainage from your incision?   If so, is the drainage saturating or coming through your dressing?   5) Have you been able to walk short distances around your home witha walker?   6) Have you been able to urinate since surgery?   7) If patient is a uni-compartmental-knee or total knee replacement, do they have outpatient physical therapy set up?  . If not, direct them tocontact their surgeon's office to ask if they recommend outpatient physical therapy. Please note: most total hip replacement patients do not need outpatient physical therapy unless their surgeon specifically orders it.    7) Do you have any other questions or concerns at this time? Nurse was unable to get a hold of patient.  A message was left  If patient has significant concerns after hours make sure they have appropriate after hours call number for their surgeon.

## 2025-05-14 NOTE — H&P (VIEW-ONLY)
[facility]      Preoperative Consultation   Stella Guidry   : 1950   Gender: female    Date of Encounter: 2025    Stella Guidry is a 75 y.o. female (1950) who presents for preop evaluation undergoing RIGHT LUMBAR 3-4 AND RIGHT LUMBAR 4-5 TRANSFORAMINAL LUMBAR INTERBODY FUSION WITH LAMINECTOMY WITH STEALTH NAVIGATION  for treatment of degenerative lumbar disc disease.    Date of Surgery: 6/3/25  Surgical Specialty: Clifford Caal MD  Orthopaedic Surgery  NPI: [id number]  [facility]  [facility], [place] [address]     Phone: [phone]  Fax: [phone]  Surgery type: inpatient  Primary Physician: Lisa Ba       History of Present Illness   This is a 75 yr old female with h/o HTN well controlled ,prediabetes -well controlled ,low cardiac risk going in for laminectimy-intermediate risk surgery     1:The patient is taking hypertensive medications compliantly without side effects.  Denies chest pain, dyspnea, edema, or TIA's.    2:has prediabetes on diet ,meds  HEMOGLOBIN A1C SCREENING (%)   Date Value   2024 6.0     HEMOGLOBIN A1C (% of total Hgb)   Date Value   2024 6.0 (H)       3:has had ongoing severe back pain seen at [facility] ,had shot ,PT no imp surgery was recommended    4:no fever chills  The patient denies ear pain, sore throat, nasal or sinus congestion or cough.     The patient denies dysuria, frequency or hematuria.    The patient denies abdominal or flank pain, anorexia, nausea or vomiting, dysphagia, change in bowel habits or black or bloody stools or weight loss.     Said want to fill POLST form ,want to be DNR and selective treatment     Form filled-original given to pt     History of Present Illness         Review of Systems   A comprehensive review of systems was negative except for items noted in HPI.    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     aspirin (ECOTRIN) 81 mg enteric coated tablet Take 81 mg by mouth.     atenoloL 100 mg tablet Take 1 Tablet (100 mg) by mouth once daily.     calcium carbonate-vitamin D3 500 mg-15 mcg (600 unit) tab Take 1 Tablet by mouth once daily.     CALCIUM-MAG OXIDE-VITAMIN D3 ORAL Take 2 Caps by mouth.     ciprofloxacin (Cipro) 500 mg tablet Take 1 Tablet (500 mg) by mouth two times daily before meals.     clotrimazole (LOTRIMIN) 1 % cream Apply topically to affected area(s) two times daily.     gabapentin (NEURONTIN) 300 mg capsule Take 2 Capsules (600 mg) by mouth at bedtime.     Glucosamine HCl-MSM 1,500-500 mg/30 mL liqd Take 2 Caps by mouth.     losartan-hydrochlorothiazide (50-12.5 mg) 50-12.5 mg tablet Take 1 Tablet by mouth once daily.     medication order composer Take 3 Tabs by mouth.     medication order composer Take 100 mg by mouth.     medication order composer Take 2 Lozenges by mouth.     metFORMIN 500 mg tablet Take 1 Tablet (500 mg) by mouth two times daily with meals.     multivitamin therapeutic (THERAGRAN; ONCOVITE) tablet Take 1 Tab by mouth.     omeprazole 20 mg Delayed-Release capsule TAKE 1 CAPSULE BY MOUTH ONCE A DAY BEFORE MEALS,new dose  ",fill when requested,2021     simvastatin 40 mg tablet Take 1 Tablet (40 mg) by mouth at bedtime.     sulfacetamide, acne, (KLARON) 10 % lotion APPLY TOPICALLY TO AFFECTED AREA(S) AT BEDTIME.     tretinoin 0.025 % cream APPLY TO AFFECTED AREA EVERY DAY AT BEDTIME.     VITAMIN B COMPLEX ORAL Take 1 Tab by mouth.     vitamin E, dl,tocopheryl acet, (VITAMIN E, DL, ACETATE,) 400 unit capsule Take 800 Units by mouth.     No current facility-administered medications for this visit.     Medications have been reviewed by me and are current to the best of my knowledge and ability.     No Known Allergies  Past Surgical History:   . Laterality Date     BREAST LUMPECTOMY      Breast Lumpectomy     COLONOSCOPY SCREENING      nl     ESOPHAGOGASTRODUODENOSCOPY  10/2015    esophagitis      KNEE ARTHROSCOPY  2009    Arthroscopy, Knee right     KNEE ARTHROSCOPY  2006, 10/2009    Arthroscopy, Knee left meniscal tear repair.     KNEE REPLACEMENT Right      left knee replacement  2015     SHOULDER REPLACEMENT Left 2019    [facility]     Social History     Tobacco Use     Smoking status: Former     Smokeless tobacco: Never   Vaping Use     Vaping status: Never Used   Substance Use Topics     Alcohol use: Not Currently     Comment: rare     Drug use: Never     Comment: THC gel for sleep but not marijuana     Family History   Problem Relation Age of Onset     Other Mother         breast cancer age 85, of CHF     Hypertension Mother      Cancer-breast Mother         85     Cancer-prostate Father         DX:  82, 2015     Heart Disease Father      Hyperlipidemia Father      Other Brother          stroke      Cancer No Family History      Cancer-colon No Family History      Cancer-ovarian No Family History      Results      PAST DIFFICULTY WITH ANESTHESIA: None     Physical Exam   /82   Pulse 82   Temp 97.7  F (36.5  C) (Oral)   Ht 1.74 m (5' 8.5\")   Wt 89.8 kg (198 lb)   SpO2 95%   BMI 29.67 " kg/m   Body mass index is 29.67 kg/m .  Physical Exam    General Appearance: Pleasant, alert, appropriate appearance for age. No acute distress  Eye Exam: PERRLA EOMI  Ear Exam: Normal TM's bilaterally. Normal auditory canals and external ears. Non-tender.  Nose Exam: Normal external nose, mucous membranes, and septum.  OroPharynx Exam: Dental hygiene adequate. Normal buccal mucosa. Normal pharynx.  Neck Exam: Supple, no masses or nodes.  Chest/Respiratory Exam: Normal chest wall and respirations. Clear to auscultation.  Cardiovascular Exam: Regular rate and rhythm. S1, S2, no murmur, click, gallop, or rubs.  Gastrointestinal Exam: Soft, nontender, no abnormal masses or organomegaly.  Foot Exam: No edema   Neurologic Exam: Nonfocal;  Psychiatric Exam: Alert and oriented, appropriate affect.       Assessment / Plan     Recommendations      Intermediate Risk Procedure    Risk of CV Complication (RCRI)  0.5%    Current Cardiac Status  Good exertional capacity ( > 4 mets )    Cardiac History  No history of coronary artery disease           Labs  HEMOGLOBIN                (g/dL)   Date Value   04/12/2024 13.4     HEMOGLOBIN (g/dL)   Date Value   05/14/2025 14.1     POTASSIUM (mmol/L)   Date Value   05/14/2025 4.2   11/13/2023 4.3   10/19/2022 4.3       EKG  Nl SR no acute changes  CXR  Not indicated    Stress Testing  Not indicated               Type & Screen should be obtained by Anesthesia only if the risk of transfusion is > 5% for the procedure         Do not take metformin the evening before the procedure or the morning of the procedure.  Continue taking Atenolol (Tenormin); be sure to take the evening before and/or morning of the procedure.  Take your other medications as usual prior to the procedure  Hold vitamins and/or supplements for 1 week prior to the procedure  Hold fish oil for 2 weeks prior to the procedure  Okay to take Acetaminophen (Tylenol) up until the procedure  Hold / avoid NSAIDs (e.g. ibuprofen,  naproxen) prior to procedure: 2 days for ibuprofen (Advil) and 4 days for naproxen (Aleve).           Labs  * Data supports elimination of  routine  laboratory testing in favor of focused,  indicated  testing based on medical co-morbidities. A 2009 study randomized 1061 patients undergoing ambulatory, non-cataract surgery to routine or to indicated testing. Perioperative adverse events were similar (Anesthesia & Analgesia 2009;108:467-75; Anesthesiol. Clin. 2016 Mar;34(1):43-58).  Stress Testing  * The current ACC/AHA guideline states that 'non-invasive stress testing is not useful for patients [with no clinical risk factors] undergoing noncardiac surgery' (JACC. 2014;64(21);e1-76.).         ICD-10-CM    1. Preop examination  Z01.818       2. Physician orders for life-sustaining treatment (POLST) form indicates patient wish for do-not-resuscitate status  Z66       3. HTN (hypertension)  I10       4. Impaired fasting blood sugar  R73.01       5. Severe back pain  M54.9       6. Mixed hyperlipidemia  E78.2         Assessment & Plan    Patient is cleared for planned procedure.   Electronically Signed by:   ELIAS King    5/14/2025

## 2025-05-28 RX ORDER — MELOXICAM 15 MG/1
15 TABLET ORAL DAILY
Status: ON HOLD | COMMUNITY
End: 2025-06-03

## 2025-05-28 RX ORDER — ATENOLOL 100 MG/1
100 TABLET ORAL EVERY EVENING
Status: ON HOLD | COMMUNITY

## 2025-06-02 ENCOUNTER — ANESTHESIA EVENT (OUTPATIENT)
Dept: SURGERY | Facility: CLINIC | Age: 75
End: 2025-06-02
Payer: MEDICARE

## 2025-06-02 RX ORDER — GABAPENTIN 300 MG/1
600 CAPSULE ORAL AT BEDTIME
Status: ON HOLD | COMMUNITY
Start: 2025-04-28

## 2025-06-02 RX ORDER — LOSARTAN POTASSIUM AND HYDROCHLOROTHIAZIDE 12.5; 5 MG/1; MG/1
1 TABLET ORAL DAILY
Status: ON HOLD | COMMUNITY

## 2025-06-02 RX ORDER — SULFACETAMIDE SODIUM 100 MG/ML
LOTION TOPICAL 2 TIMES DAILY
Status: ON HOLD | COMMUNITY

## 2025-06-03 ENCOUNTER — HOSPITAL ENCOUNTER (INPATIENT)
Facility: CLINIC | Age: 75
End: 2025-06-03
Attending: ORTHOPAEDIC SURGERY | Admitting: ORTHOPAEDIC SURGERY
Payer: MEDICARE

## 2025-06-03 ENCOUNTER — APPOINTMENT (OUTPATIENT)
Dept: RADIOLOGY | Facility: CLINIC | Age: 75
DRG: 428 | End: 2025-06-03
Attending: ORTHOPAEDIC SURGERY
Payer: MEDICARE

## 2025-06-03 ENCOUNTER — ANCILLARY PROCEDURE (OUTPATIENT)
Dept: ULTRASOUND IMAGING | Facility: CLINIC | Age: 75
End: 2025-06-03
Attending: ANESTHESIOLOGY
Payer: MEDICARE

## 2025-06-03 ENCOUNTER — ANESTHESIA (OUTPATIENT)
Dept: SURGERY | Facility: CLINIC | Age: 75
End: 2025-06-03
Payer: MEDICARE

## 2025-06-03 DIAGNOSIS — Z98.1 STATUS POST LUMBAR SPINAL FUSION: Primary | ICD-10-CM

## 2025-06-03 LAB
GLUCOSE BLDC GLUCOMTR-MCNC: 116 MG/DL (ref 70–99)
GLUCOSE BLDC GLUCOMTR-MCNC: 116 MG/DL (ref 70–99)
GLUCOSE BLDC GLUCOMTR-MCNC: 139 MG/DL (ref 70–99)
GLUCOSE BLDC GLUCOMTR-MCNC: 166 MG/DL (ref 70–99)

## 2025-06-03 PROCEDURE — 250N000011 HC RX IP 250 OP 636

## 2025-06-03 PROCEDURE — L0627 LO SAG RI AN/POS PNL PRE CST: HCPCS

## 2025-06-03 PROCEDURE — 250N000025 HC SEVOFLURANE, PER MIN: Performed by: ORTHOPAEDIC SURGERY

## 2025-06-03 PROCEDURE — 0SG1071 FUSION OF 2 OR MORE LUMBAR VERTEBRAL JOINTS WITH AUTOLOGOUS TISSUE SUBSTITUTE, POSTERIOR APPROACH, POSTERIOR COLUMN, OPEN APPROACH: ICD-10-PCS | Performed by: ORTHOPAEDIC SURGERY

## 2025-06-03 PROCEDURE — 710N000010 HC RECOVERY PHASE 1, LEVEL 2, PER MIN: Performed by: ORTHOPAEDIC SURGERY

## 2025-06-03 PROCEDURE — 250N000013 HC RX MED GY IP 250 OP 250 PS 637: Performed by: HOSPITALIST

## 2025-06-03 PROCEDURE — 999N000141 HC STATISTIC PRE-PROCEDURE NURSING ASSESSMENT: Performed by: ORTHOPAEDIC SURGERY

## 2025-06-03 PROCEDURE — 8E0WXBG COMPUTER ASSISTED PROCEDURE OF TRUNK REGION, WITH COMPUTERIZED TOMOGRAPHY: ICD-10-PCS | Performed by: ORTHOPAEDIC SURGERY

## 2025-06-03 PROCEDURE — 370N000017 HC ANESTHESIA TECHNICAL FEE, PER MIN: Performed by: ORTHOPAEDIC SURGERY

## 2025-06-03 PROCEDURE — C1762 CONN TISS, HUMAN(INC FASCIA): HCPCS | Performed by: ORTHOPAEDIC SURGERY

## 2025-06-03 PROCEDURE — 258N000003 HC RX IP 258 OP 636: Performed by: ANESTHESIOLOGY

## 2025-06-03 PROCEDURE — 250N000005 HC OR RX SURGIFLO HEMOSTATIC MATRIX 10ML 199102S OPNP: Performed by: ORTHOPAEDIC SURGERY

## 2025-06-03 PROCEDURE — 258N000003 HC RX IP 258 OP 636

## 2025-06-03 PROCEDURE — 272N000001 HC OR GENERAL SUPPLY STERILE: Performed by: ORTHOPAEDIC SURGERY

## 2025-06-03 PROCEDURE — 258N000003 HC RX IP 258 OP 636: Performed by: NURSE ANESTHETIST, CERTIFIED REGISTERED

## 2025-06-03 PROCEDURE — 250N000011 HC RX IP 250 OP 636: Mod: JZ | Performed by: ANESTHESIOLOGY

## 2025-06-03 PROCEDURE — 120N000001 HC R&B MED SURG/OB

## 2025-06-03 PROCEDURE — C1713 ANCHOR/SCREW BN/BN,TIS/BN: HCPCS | Performed by: ORTHOPAEDIC SURGERY

## 2025-06-03 PROCEDURE — 250N000009 HC RX 250: Performed by: NURSE ANESTHETIST, CERTIFIED REGISTERED

## 2025-06-03 PROCEDURE — P9045 ALBUMIN (HUMAN), 5%, 250 ML: HCPCS | Performed by: NURSE ANESTHETIST, CERTIFIED REGISTERED

## 2025-06-03 PROCEDURE — 999N000182 XR SURGERY OARM

## 2025-06-03 PROCEDURE — 250N000011 HC RX IP 250 OP 636: Performed by: ORTHOPAEDIC SURGERY

## 2025-06-03 PROCEDURE — 250N000009 HC RX 250: Performed by: ORTHOPAEDIC SURGERY

## 2025-06-03 PROCEDURE — 250N000011 HC RX IP 250 OP 636: Performed by: NURSE ANESTHETIST, CERTIFIED REGISTERED

## 2025-06-03 PROCEDURE — 0ST20ZZ RESECTION OF LUMBAR VERTEBRAL DISC, OPEN APPROACH: ICD-10-PCS | Performed by: ORTHOPAEDIC SURGERY

## 2025-06-03 PROCEDURE — 250N000013 HC RX MED GY IP 250 OP 250 PS 637

## 2025-06-03 PROCEDURE — 360N000086 HC SURGERY LEVEL 6 W/ FLUORO, PER MIN: Performed by: ORTHOPAEDIC SURGERY

## 2025-06-03 PROCEDURE — 250N000009 HC RX 250: Performed by: ANESTHESIOLOGY

## 2025-06-03 PROCEDURE — 01NB0ZZ RELEASE LUMBAR NERVE, OPEN APPROACH: ICD-10-PCS | Performed by: ORTHOPAEDIC SURGERY

## 2025-06-03 PROCEDURE — 272N000002 HC OR SUPPLY OTHER OPNP: Performed by: ORTHOPAEDIC SURGERY

## 2025-06-03 PROCEDURE — 0SG10AJ FUSION OF 2 OR MORE LUMBAR VERTEBRAL JOINTS WITH INTERBODY FUSION DEVICE, POSTERIOR APPROACH, ANTERIOR COLUMN, OPEN APPROACH: ICD-10-PCS | Performed by: ORTHOPAEDIC SURGERY

## 2025-06-03 PROCEDURE — 258N000003 HC RX IP 258 OP 636: Performed by: ORTHOPAEDIC SURGERY

## 2025-06-03 DEVICE — IMP SCR MEDT 4.75MM SOLERA 5.5X55MM MA 54840005555: Type: IMPLANTABLE DEVICE | Site: SPINE LUMBAR | Status: FUNCTIONAL

## 2025-06-03 DEVICE — IMP ROD MEDT 6CM 1475501060: Type: IMPLANTABLE DEVICE | Status: FUNCTIONAL

## 2025-06-03 DEVICE — IMP SCR MEDT BREAK-OFF SET SOLERA 4.75MM TI 5440030: Type: IMPLANTABLE DEVICE | Status: FUNCTIONAL

## 2025-06-03 DEVICE — IMP SCR MEDT 4.75MM SOLERA 6.5X50MM MA 54840006550: Type: IMPLANTABLE DEVICE | Site: SPINE LUMBAR | Status: FUNCTIONAL

## 2025-06-03 DEVICE — IMP SCR MEDT 4.75MM SOLERA 6.5X55MM MA 54840006555: Type: IMPLANTABLE DEVICE | Site: SPINE LUMBAR | Status: FUNCTIONAL

## 2025-06-03 DEVICE — SPACER 6068096 CATALYFT PL LONG 9MM
Type: IMPLANTABLE DEVICE | Status: FUNCTIONAL
Brand: CATALYFT PL EXPANDABLE INTERBODY SYSTEM

## 2025-06-03 DEVICE — KIT BNGF 9CC DMNR CORT FBR ACCELERATE GRFTN CNN T50209: Type: IMPLANTABLE DEVICE | Status: FUNCTIONAL

## 2025-06-03 DEVICE — MAGNETOS EASYPACK PUTTY 10CC 1-2MM USA
Type: IMPLANTABLE DEVICE | Site: SPINE LUMBAR | Status: FUNCTIONAL
Brand: MAGNETOS

## 2025-06-03 RX ORDER — BUPIVACAINE HYDROCHLORIDE AND EPINEPHRINE 2.5; 5 MG/ML; UG/ML
INJECTION, SOLUTION EPIDURAL; INFILTRATION; INTRACAUDAL; PERINEURAL PRN
Status: DISCONTINUED | OUTPATIENT
Start: 2025-06-03 | End: 2025-06-03 | Stop reason: HOSPADM

## 2025-06-03 RX ORDER — FENTANYL CITRATE 50 UG/ML
25 INJECTION, SOLUTION INTRAMUSCULAR; INTRAVENOUS EVERY 5 MIN PRN
Status: DISCONTINUED | OUTPATIENT
Start: 2025-06-03 | End: 2025-06-03 | Stop reason: HOSPADM

## 2025-06-03 RX ORDER — PROCHLORPERAZINE MALEATE 5 MG/1
5 TABLET ORAL EVERY 6 HOURS PRN
Status: ACTIVE | OUTPATIENT
Start: 2025-06-03

## 2025-06-03 RX ORDER — GABAPENTIN 100 MG/1
100 CAPSULE ORAL
Status: COMPLETED | OUTPATIENT
Start: 2025-06-03 | End: 2025-06-03

## 2025-06-03 RX ORDER — VITAMIN B COMPLEX
25 TABLET ORAL DAILY
Status: DISPENSED | OUTPATIENT
Start: 2025-06-03

## 2025-06-03 RX ORDER — NALOXONE HYDROCHLORIDE 0.4 MG/ML
0.1 INJECTION, SOLUTION INTRAMUSCULAR; INTRAVENOUS; SUBCUTANEOUS
Status: DISCONTINUED | OUTPATIENT
Start: 2025-06-03 | End: 2025-06-03 | Stop reason: HOSPADM

## 2025-06-03 RX ORDER — ONDANSETRON 4 MG/1
4 TABLET, ORALLY DISINTEGRATING ORAL EVERY 30 MIN PRN
Status: ACTIVE | OUTPATIENT
Start: 2025-06-03

## 2025-06-03 RX ORDER — CEFAZOLIN SODIUM 2 G/50ML
2 SOLUTION INTRAVENOUS EVERY 8 HOURS
Status: COMPLETED | OUTPATIENT
Start: 2025-06-03 | End: 2025-06-04

## 2025-06-03 RX ORDER — VANCOMYCIN HYDROCHLORIDE 1 G/20ML
INJECTION, POWDER, LYOPHILIZED, FOR SOLUTION INTRAVENOUS PRN
Status: DISCONTINUED | OUTPATIENT
Start: 2025-06-03 | End: 2025-06-03 | Stop reason: HOSPADM

## 2025-06-03 RX ORDER — HYDROMORPHONE HCL IN WATER/PF 6 MG/30 ML
0.4 PATIENT CONTROLLED ANALGESIA SYRINGE INTRAVENOUS EVERY 5 MIN PRN
Status: DISCONTINUED | OUTPATIENT
Start: 2025-06-03 | End: 2025-06-03 | Stop reason: HOSPADM

## 2025-06-03 RX ORDER — DEXAMETHASONE SODIUM PHOSPHATE 4 MG/ML
INJECTION, SOLUTION INTRA-ARTICULAR; INTRALESIONAL; INTRAMUSCULAR; INTRAVENOUS; SOFT TISSUE PRN
Status: DISCONTINUED | OUTPATIENT
Start: 2025-06-03 | End: 2025-06-03

## 2025-06-03 RX ORDER — THERA TABS 400 MCG
1 TAB ORAL DAILY
Status: DISPENSED | OUTPATIENT
Start: 2025-06-04

## 2025-06-03 RX ORDER — FENTANYL CITRATE 50 UG/ML
50 INJECTION, SOLUTION INTRAMUSCULAR; INTRAVENOUS EVERY 5 MIN PRN
Status: DISCONTINUED | OUTPATIENT
Start: 2025-06-03 | End: 2025-06-03 | Stop reason: HOSPADM

## 2025-06-03 RX ORDER — ACETAMINOPHEN 325 MG/1
975 TABLET ORAL EVERY 8 HOURS
Status: DISPENSED | OUTPATIENT
Start: 2025-06-03

## 2025-06-03 RX ORDER — OMEPRAZOLE 20 MG/1
20 CAPSULE, DELAYED RELEASE ORAL DAILY
Status: DISCONTINUED | OUTPATIENT
Start: 2025-06-04 | End: 2025-06-03

## 2025-06-03 RX ORDER — CEFAZOLIN SODIUM/WATER 2 G/20 ML
2 SYRINGE (ML) INTRAVENOUS SEE ADMIN INSTRUCTIONS
Status: DISCONTINUED | OUTPATIENT
Start: 2025-06-03 | End: 2025-06-03 | Stop reason: HOSPADM

## 2025-06-03 RX ORDER — ATENOLOL 50 MG/1
100 TABLET ORAL EVERY EVENING
Status: DISPENSED | OUTPATIENT
Start: 2025-06-03

## 2025-06-03 RX ORDER — OXYCODONE HYDROCHLORIDE 5 MG/1
5 TABLET ORAL
Status: ACTIVE | OUTPATIENT
Start: 2025-06-03

## 2025-06-03 RX ORDER — GABAPENTIN 300 MG/1
600 CAPSULE ORAL AT BEDTIME
Status: DISPENSED | OUTPATIENT
Start: 2025-06-04

## 2025-06-03 RX ORDER — CEFAZOLIN SODIUM/WATER 2 G/20 ML
2 SYRINGE (ML) INTRAVENOUS
Status: COMPLETED | OUTPATIENT
Start: 2025-06-03 | End: 2025-06-03

## 2025-06-03 RX ORDER — SODIUM CHLORIDE, SODIUM LACTATE, POTASSIUM CHLORIDE, CALCIUM CHLORIDE 600; 310; 30; 20 MG/100ML; MG/100ML; MG/100ML; MG/100ML
INJECTION, SOLUTION INTRAVENOUS CONTINUOUS
Status: DISCONTINUED | OUTPATIENT
Start: 2025-06-03 | End: 2025-06-03 | Stop reason: HOSPADM

## 2025-06-03 RX ORDER — OXYCODONE HYDROCHLORIDE 5 MG/1
5 TABLET ORAL EVERY 4 HOURS PRN
Status: DISPENSED | OUTPATIENT
Start: 2025-06-03

## 2025-06-03 RX ORDER — GINSENG 100 MG
200 CAPSULE ORAL DAILY
Status: ON HOLD | COMMUNITY

## 2025-06-03 RX ORDER — ONDANSETRON 4 MG/1
4 TABLET, ORALLY DISINTEGRATING ORAL EVERY 30 MIN PRN
Status: DISCONTINUED | OUTPATIENT
Start: 2025-06-03 | End: 2025-06-03 | Stop reason: HOSPADM

## 2025-06-03 RX ORDER — HYDROMORPHONE HCL IN WATER/PF 6 MG/30 ML
0.2 PATIENT CONTROLLED ANALGESIA SYRINGE INTRAVENOUS EVERY 5 MIN PRN
Status: DISCONTINUED | OUTPATIENT
Start: 2025-06-03 | End: 2025-06-03 | Stop reason: HOSPADM

## 2025-06-03 RX ORDER — ACETAMINOPHEN 325 MG/1
975 TABLET ORAL ONCE
Status: COMPLETED | OUTPATIENT
Start: 2025-06-03 | End: 2025-06-03

## 2025-06-03 RX ORDER — DEXTROSE MONOHYDRATE 25 G/50ML
25-50 INJECTION, SOLUTION INTRAVENOUS
Status: ACTIVE | OUTPATIENT
Start: 2025-06-03

## 2025-06-03 RX ORDER — CALCIUM CARBONATE 260MG(650)
1 TABLET,CHEWABLE ORAL 2 TIMES DAILY
Status: CANCELLED | OUTPATIENT
Start: 2025-06-03

## 2025-06-03 RX ORDER — HYDROMORPHONE HCL IN WATER/PF 6 MG/30 ML
0.2 PATIENT CONTROLLED ANALGESIA SYRINGE INTRAVENOUS EVERY 4 HOURS PRN
Status: ACTIVE | OUTPATIENT
Start: 2025-06-03

## 2025-06-03 RX ORDER — ONDANSETRON 2 MG/ML
INJECTION INTRAMUSCULAR; INTRAVENOUS PRN
Status: DISCONTINUED | OUTPATIENT
Start: 2025-06-03 | End: 2025-06-03

## 2025-06-03 RX ORDER — FENTANYL CITRATE 50 UG/ML
100 INJECTION, SOLUTION INTRAMUSCULAR; INTRAVENOUS ONCE
Status: COMPLETED | OUTPATIENT
Start: 2025-06-03 | End: 2025-06-03

## 2025-06-03 RX ORDER — POLYETHYLENE GLYCOL 3350 17 G/17G
17 POWDER, FOR SOLUTION ORAL DAILY
Status: DISPENSED | OUTPATIENT
Start: 2025-06-04

## 2025-06-03 RX ORDER — PANTOPRAZOLE SODIUM 40 MG/1
40 TABLET, DELAYED RELEASE ORAL
Status: DISPENSED | OUTPATIENT
Start: 2025-06-04

## 2025-06-03 RX ORDER — ONDANSETRON 2 MG/ML
4 INJECTION INTRAMUSCULAR; INTRAVENOUS EVERY 6 HOURS PRN
Status: ACTIVE | OUTPATIENT
Start: 2025-06-03

## 2025-06-03 RX ORDER — NICOTINE POLACRILEX 4 MG
15-30 LOZENGE BUCCAL
Status: ACTIVE | OUTPATIENT
Start: 2025-06-03

## 2025-06-03 RX ORDER — DEXAMETHASONE SODIUM PHOSPHATE 4 MG/ML
4 INJECTION, SOLUTION INTRA-ARTICULAR; INTRALESIONAL; INTRAMUSCULAR; INTRAVENOUS; SOFT TISSUE
Status: ACTIVE | OUTPATIENT
Start: 2025-06-03

## 2025-06-03 RX ORDER — CALCIUM CARBONATE 500(1250)
1 TABLET ORAL 2 TIMES DAILY
Status: DISPENSED | OUTPATIENT
Start: 2025-06-03

## 2025-06-03 RX ORDER — LOSARTAN POTASSIUM AND HYDROCHLOROTHIAZIDE 12.5; 5 MG/1; MG/1
1 TABLET ORAL DAILY
Status: DISPENSED | OUTPATIENT
Start: 2025-06-03

## 2025-06-03 RX ORDER — TYROSINE 500 MG
200 CAPSULE ORAL EVERY EVENING
Status: ON HOLD | COMMUNITY

## 2025-06-03 RX ORDER — BUPIVACAINE HYDROCHLORIDE AND EPINEPHRINE 2.5; 5 MG/ML; UG/ML
INJECTION, SOLUTION EPIDURAL; INFILTRATION; INTRACAUDAL; PERINEURAL
Status: DISCONTINUED
Start: 2025-06-03 | End: 2025-06-03 | Stop reason: HOSPADM

## 2025-06-03 RX ORDER — VANCOMYCIN HYDROCHLORIDE 1 G/20ML
INJECTION, POWDER, LYOPHILIZED, FOR SOLUTION INTRAVENOUS
Status: DISCONTINUED
Start: 2025-06-03 | End: 2025-06-03 | Stop reason: HOSPADM

## 2025-06-03 RX ORDER — OXYCODONE HYDROCHLORIDE 5 MG/1
10 TABLET ORAL
Status: COMPLETED | OUTPATIENT
Start: 2025-06-03 | End: 2025-06-05

## 2025-06-03 RX ORDER — SIMVASTATIN 40 MG
40 TABLET ORAL AT BEDTIME
Status: DISPENSED | OUTPATIENT
Start: 2025-06-03

## 2025-06-03 RX ORDER — SODIUM CHLORIDE 9 MG/ML
INJECTION, SOLUTION INTRAVENOUS CONTINUOUS
Status: ACTIVE | OUTPATIENT
Start: 2025-06-03

## 2025-06-03 RX ORDER — ASPIRIN 81 MG/1
81 TABLET ORAL EVERY EVENING
Status: ON HOLD | COMMUNITY
End: 2025-06-04

## 2025-06-03 RX ORDER — TRETINOIN 0.25 MG/G
CREAM TOPICAL
Status: ACTIVE | OUTPATIENT
Start: 2025-06-03

## 2025-06-03 RX ORDER — CLOTRIMAZOLE 1 %
CREAM (GRAM) TOPICAL 2 TIMES DAILY PRN
Status: ACTIVE | OUTPATIENT
Start: 2025-06-03

## 2025-06-03 RX ORDER — HEPARIN SOD,PORCINE/0.9 % NACL 30K/1000ML
INTRAVENOUS SOLUTION INTRAVENOUS ONCE
Status: DISCONTINUED | OUTPATIENT
Start: 2025-06-03 | End: 2025-06-03 | Stop reason: HOSPADM

## 2025-06-03 RX ORDER — LORATADINE 10 MG/1
10 TABLET ORAL DAILY PRN
Status: ACTIVE | OUTPATIENT
Start: 2025-06-03

## 2025-06-03 RX ORDER — MULTIVITAMIN WITH IRON
1 TABLET ORAL DAILY
Status: ON HOLD | COMMUNITY

## 2025-06-03 RX ORDER — ONDANSETRON 2 MG/ML
4 INJECTION INTRAMUSCULAR; INTRAVENOUS EVERY 30 MIN PRN
Status: DISCONTINUED | OUTPATIENT
Start: 2025-06-03 | End: 2025-06-03 | Stop reason: HOSPADM

## 2025-06-03 RX ORDER — BISACODYL 10 MG
10 SUPPOSITORY, RECTAL RECTAL DAILY PRN
Status: ACTIVE | OUTPATIENT
Start: 2025-06-06

## 2025-06-03 RX ORDER — ONDANSETRON 2 MG/ML
4 INJECTION INTRAMUSCULAR; INTRAVENOUS EVERY 30 MIN PRN
Status: ACTIVE | OUTPATIENT
Start: 2025-06-03

## 2025-06-03 RX ORDER — ONDANSETRON 4 MG/1
4 TABLET, ORALLY DISINTEGRATING ORAL EVERY 6 HOURS PRN
Status: ACTIVE | OUTPATIENT
Start: 2025-06-03

## 2025-06-03 RX ORDER — MAGNESIUM HYDROXIDE 1200 MG/15ML
LIQUID ORAL PRN
Status: DISCONTINUED | OUTPATIENT
Start: 2025-06-03 | End: 2025-06-03 | Stop reason: HOSPADM

## 2025-06-03 RX ORDER — FENTANYL CITRATE 50 UG/ML
100 INJECTION, SOLUTION INTRAMUSCULAR; INTRAVENOUS
Status: DISCONTINUED | OUTPATIENT
Start: 2025-06-03 | End: 2025-06-03 | Stop reason: HOSPADM

## 2025-06-03 RX ORDER — EPHEDRINE SULFATE 50 MG/ML
INJECTION, SOLUTION INTRAMUSCULAR; INTRAVENOUS; SUBCUTANEOUS PRN
Status: DISCONTINUED | OUTPATIENT
Start: 2025-06-03 | End: 2025-06-03

## 2025-06-03 RX ORDER — CALCIUM CARBONATE 260MG(650)
1 TABLET,CHEWABLE ORAL 2 TIMES DAILY
Status: ON HOLD | COMMUNITY

## 2025-06-03 RX ORDER — DEXAMETHASONE SODIUM PHOSPHATE 4 MG/ML
4 INJECTION, SOLUTION INTRA-ARTICULAR; INTRALESIONAL; INTRAMUSCULAR; INTRAVENOUS; SOFT TISSUE
Status: DISCONTINUED | OUTPATIENT
Start: 2025-06-03 | End: 2025-06-03 | Stop reason: HOSPADM

## 2025-06-03 RX ORDER — NALOXONE HYDROCHLORIDE 0.4 MG/ML
0.1 INJECTION, SOLUTION INTRAMUSCULAR; INTRAVENOUS; SUBCUTANEOUS
Status: ACTIVE | OUTPATIENT
Start: 2025-06-03

## 2025-06-03 RX ORDER — PROPOFOL 10 MG/ML
INJECTION, EMULSION INTRAVENOUS PRN
Status: DISCONTINUED | OUTPATIENT
Start: 2025-06-03 | End: 2025-06-03

## 2025-06-03 RX ORDER — FLUMAZENIL 0.1 MG/ML
0.2 INJECTION, SOLUTION INTRAVENOUS
Status: DISCONTINUED | OUTPATIENT
Start: 2025-06-03 | End: 2025-06-03 | Stop reason: HOSPADM

## 2025-06-03 RX ORDER — HYDROMORPHONE HCL IN WATER/PF 6 MG/30 ML
0.1 PATIENT CONTROLLED ANALGESIA SYRINGE INTRAVENOUS EVERY 4 HOURS PRN
Status: ACTIVE | OUTPATIENT
Start: 2025-06-03

## 2025-06-03 RX ORDER — SULFACETAMIDE SODIUM 100 MG/ML
LOTION TOPICAL 2 TIMES DAILY PRN
Status: ACTIVE | OUTPATIENT
Start: 2025-06-03

## 2025-06-03 RX ORDER — AMOXICILLIN 250 MG
1 CAPSULE ORAL 2 TIMES DAILY
Status: DISPENSED | OUTPATIENT
Start: 2025-06-03

## 2025-06-03 RX ORDER — CALCIUM CARBONATE 260MG(650)
1 TABLET,CHEWABLE ORAL 2 TIMES DAILY
Status: DISCONTINUED | OUTPATIENT
Start: 2025-06-03 | End: 2025-06-03

## 2025-06-03 RX ORDER — LIDOCAINE 40 MG/G
CREAM TOPICAL
Status: DISCONTINUED | OUTPATIENT
Start: 2025-06-03 | End: 2025-06-03 | Stop reason: HOSPADM

## 2025-06-03 RX ADMIN — SODIUM CHLORIDE, SODIUM LACTATE, POTASSIUM CHLORIDE, AND CALCIUM CHLORIDE: .6; .31; .03; .02 INJECTION, SOLUTION INTRAVENOUS at 09:43

## 2025-06-03 RX ADMIN — SODIUM CHLORIDE, SODIUM LACTATE, POTASSIUM CHLORIDE, AND CALCIUM CHLORIDE: .6; .31; .03; .02 INJECTION, SOLUTION INTRAVENOUS at 06:39

## 2025-06-03 RX ADMIN — SIMVASTATIN 40 MG: 40 TABLET, FILM COATED ORAL at 20:20

## 2025-06-03 RX ADMIN — Medication 10 MG: at 09:42

## 2025-06-03 RX ADMIN — ROCURONIUM BROMIDE 10 MG: 10 INJECTION, SOLUTION INTRAVENOUS at 08:01

## 2025-06-03 RX ADMIN — Medication 25 MCG: at 15:39

## 2025-06-03 RX ADMIN — MIDAZOLAM 2 MG: 1 INJECTION INTRAMUSCULAR; INTRAVENOUS at 07:46

## 2025-06-03 RX ADMIN — SODIUM CHLORIDE, SODIUM LACTATE, POTASSIUM CHLORIDE, AND CALCIUM CHLORIDE 100 ML/HR: .6; .31; .03; .02 INJECTION, SOLUTION INTRAVENOUS at 12:28

## 2025-06-03 RX ADMIN — ALBUMIN (HUMAN): 12.5 SOLUTION INTRAVENOUS at 08:51

## 2025-06-03 RX ADMIN — Medication 2 G: at 07:38

## 2025-06-03 RX ADMIN — FENTANYL CITRATE 50 MCG: 50 INJECTION INTRAMUSCULAR; INTRAVENOUS at 11:50

## 2025-06-03 RX ADMIN — Medication 200 MG: at 11:45

## 2025-06-03 RX ADMIN — PHENYLEPHRINE HYDROCHLORIDE 100 MCG: 10 INJECTION INTRAVENOUS at 07:53

## 2025-06-03 RX ADMIN — HYDROMORPHONE HYDROCHLORIDE 0.5 MG: 1 INJECTION, SOLUTION INTRAMUSCULAR; INTRAVENOUS; SUBCUTANEOUS at 08:25

## 2025-06-03 RX ADMIN — DEXMEDETOMIDINE HYDROCHLORIDE 12 MCG: 100 INJECTION, SOLUTION INTRAVENOUS at 08:25

## 2025-06-03 RX ADMIN — OXYCODONE 5 MG: 5 TABLET ORAL at 14:15

## 2025-06-03 RX ADMIN — ATENOLOL 100 MG: 50 TABLET ORAL at 20:20

## 2025-06-03 RX ADMIN — ONDANSETRON 4 MG: 2 INJECTION INTRAMUSCULAR; INTRAVENOUS at 11:48

## 2025-06-03 RX ADMIN — ROCURONIUM BROMIDE 20 MG: 10 INJECTION, SOLUTION INTRAVENOUS at 08:24

## 2025-06-03 RX ADMIN — CALCIUM 500 MG: 500 TABLET ORAL at 20:20

## 2025-06-03 RX ADMIN — ALBUMIN (HUMAN): 12.5 SOLUTION INTRAVENOUS at 09:50

## 2025-06-03 RX ADMIN — DEXMEDETOMIDINE HYDROCHLORIDE 4 MCG: 100 INJECTION, SOLUTION INTRAVENOUS at 09:27

## 2025-06-03 RX ADMIN — Medication 2 G: at 11:38

## 2025-06-03 RX ADMIN — ACETAMINOPHEN 975 MG: 325 TABLET ORAL at 23:06

## 2025-06-03 RX ADMIN — DEXAMETHASONE SODIUM PHOSPHATE 4 MG: 4 INJECTION, SOLUTION INTRA-ARTICULAR; INTRALESIONAL; INTRAMUSCULAR; INTRAVENOUS; SOFT TISSUE at 07:51

## 2025-06-03 RX ADMIN — PHENYLEPHRINE HYDROCHLORIDE 100 MCG: 10 INJECTION INTRAVENOUS at 07:51

## 2025-06-03 RX ADMIN — OXYCODONE 5 MG: 5 TABLET ORAL at 18:45

## 2025-06-03 RX ADMIN — ACETAMINOPHEN 975 MG: 325 TABLET ORAL at 14:16

## 2025-06-03 RX ADMIN — DEXMEDETOMIDINE HYDROCHLORIDE 4 MCG: 100 INJECTION, SOLUTION INTRAVENOUS at 08:29

## 2025-06-03 RX ADMIN — PHENYLEPHRINE HYDROCHLORIDE 100 MCG: 10 INJECTION INTRAVENOUS at 09:22

## 2025-06-03 RX ADMIN — FENTANYL CITRATE 50 MCG: 50 INJECTION INTRAMUSCULAR; INTRAVENOUS at 12:30

## 2025-06-03 RX ADMIN — PROPOFOL 200 MG: 10 INJECTION, EMULSION INTRAVENOUS at 07:47

## 2025-06-03 RX ADMIN — ROCURONIUM BROMIDE 20 MG: 10 INJECTION, SOLUTION INTRAVENOUS at 08:50

## 2025-06-03 RX ADMIN — PROPOFOL 35 MCG/KG/MIN: 10 INJECTION, EMULSION INTRAVENOUS at 07:49

## 2025-06-03 RX ADMIN — GABAPENTIN 100 MG: 100 CAPSULE ORAL at 06:36

## 2025-06-03 RX ADMIN — SODIUM CHLORIDE: 0.9 INJECTION, SOLUTION INTRAVENOUS at 14:16

## 2025-06-03 RX ADMIN — LIDOCAINE HYDROCHLORIDE 50 MG: 10 INJECTION, SOLUTION EPIDURAL; INFILTRATION; INTRACAUDAL; PERINEURAL at 07:46

## 2025-06-03 RX ADMIN — OXYCODONE 5 MG: 5 TABLET ORAL at 23:06

## 2025-06-03 RX ADMIN — PHENYLEPHRINE HYDROCHLORIDE 0.5 MCG/KG/MIN: 10 INJECTION INTRAVENOUS at 07:51

## 2025-06-03 RX ADMIN — HYDROMORPHONE HYDROCHLORIDE 0.5 MG: 1 INJECTION, SOLUTION INTRAMUSCULAR; INTRAVENOUS; SUBCUTANEOUS at 08:29

## 2025-06-03 RX ADMIN — CEFAZOLIN SODIUM 2 G: 2 SOLUTION INTRAVENOUS at 20:21

## 2025-06-03 RX ADMIN — ROCURONIUM BROMIDE 50 MG: 10 INJECTION, SOLUTION INTRAVENOUS at 07:47

## 2025-06-03 RX ADMIN — SENNOSIDES AND DOCUSATE SODIUM 1 TABLET: 50; 8.6 TABLET ORAL at 20:20

## 2025-06-03 RX ADMIN — ACETAMINOPHEN 975 MG: 325 TABLET ORAL at 06:36

## 2025-06-03 RX ADMIN — FENTANYL CITRATE 100 MCG: 50 INJECTION INTRAMUSCULAR; INTRAVENOUS at 07:46

## 2025-06-03 ASSESSMENT — ACTIVITIES OF DAILY LIVING (ADL)
ADLS_ACUITY_SCORE: 29
ADLS_ACUITY_SCORE: 23
ADLS_ACUITY_SCORE: 32
ADLS_ACUITY_SCORE: 23
ADLS_ACUITY_SCORE: 29
ADLS_ACUITY_SCORE: 23
ADLS_ACUITY_SCORE: 29
ADLS_ACUITY_SCORE: 32
ADLS_ACUITY_SCORE: 32
ADLS_ACUITY_SCORE: 33
ADLS_ACUITY_SCORE: 23
ADLS_ACUITY_SCORE: 21
ADLS_ACUITY_SCORE: 34
ADLS_ACUITY_SCORE: 34
ADLS_ACUITY_SCORE: 23
ADLS_ACUITY_SCORE: 25

## 2025-06-03 NOTE — OP NOTE
DATE OF SERVICE: 06/03/2025     PREOPERATIVE DIAGNOSES:   1.  Severe spinal stenosis at L3-4 and L4-5  2.  L3-4 and L4-5 spondylolisthesis   3.  L3-4 and L4-5 degenerative disc disease  4.  Failure of conservative measures     POSTOPERATIVE DIAGNOSES:   1.  Severe spinal stenosis at L3-4 and L4-5  2.  L3-4 and L4-5 spondylolisthesis   3.  L3-4 and L4-5 degenerative disc disease  4.  Failure of conservative measures        PROCEDURE:   1. Right-sided transforaminal/transfacet decompression of the exiting L4 and traversing L5 nerve roots.   2. Transforaminal lumbar interbody fusion L4-5 with autograft bone, Medtronic Catalyft interbody long 9  3. Right-sided transforaminal/transfacet decompression of the exiting L3 and traversing L4 nerve roots.   4. Transforaminal lumbar interbody fusion L3-4 with autograft bone, Medtronic Catalyft interbody long 9  5. Posterior lateral fusion on the left at L3-4 and L4-5.   6. Segmental pedicle screw fixation in the pedicles of L3, L4, and L5 bilaterally  7. Complete laminectomy at L3-4 and L4-5  8. O arm with intra operative navigation        Medtronic Solera navigated pedicle screws systems.      SURGEON: Dr. Clifford Caal.      ASSISTANT: Radha Jamil PA-C, who was needed for patient positioning, soft tissue retraction, patient safety and assistance with closure of the wound.  She was vital in the protection of the nerve roots as well as the dura.  This could only be performed by a surgical PA trained in spine     ESTIMATED BLOOD LOSS: 200 mL     DRAINS: Hemovac      COMPLICATIONS: None perceived.      SPECIMENS SENT: None.      HISTORY OF PRESENT ILLNESS AND INDICATIONS FOR PROCEDURE:   This is a pleasant 75 year old female that presented with severe buttock and leg pain.  MRI demonstrated L3-4 and L4-5 severe central stenosis with L3-4 and L4-5 spondylolisthesis and L3-4 and L4-5 degenerative disc disease.  She underwent non-operative treatment with injections as well as  therapy with no improvement long term.  We discussed because of this that she was a candidate for surgery.  I discussed that a fusion would give her more reliable results given spondylolisthesis.  She wished to proceed with the fusion.  We discussed the risks and benefits which include but are not limited to bleeding, infection, damage to the nerve or dura, failure of procedure to provide pain relief, foot drop, iatrogenic instability, pseudoarthrosis, need for additional procedures, and risks associated with anesthesia.  The intention of the surgery is to treat leg pain and will not reliably treat any back pain. She was completely clear on this.  Following this, they would like to proceed with the fusion.       DESCRIPTION OF PROCEDURE     Therefore, the patient was brought to the operating room. He was intubated via general endotracheal anesthetic and placed on a Alen table with a Abelino frame, care taken to pad all of her bony prominences. Pause for the Cause was performed correctly identifying the patient, procedure, proposed plan and radiographs and all were in agreement. We then dissected down over the L3 and L4 hemilamina bilaterally. We dissected over the facet joints at L3-4 and L4-5 bilaterally.  In addition to this, the transverse process was visualized at the L3, L4 and L5 level.  O arm was brought in and a tracer was placed on the L5 spinous process.  An intraoperative CT scan was taken.       We then turned our attention to placing pedicle screws, first using a Midas Greg bur, then a pedicle probe, then a Rausch probe to palpate the cannulated pedicle. We then tapped the holes to 5.5 mm at L4 and L5 with 4.5 mm at L3.  We then used the Rausch probe again to palpate all 4 quadrants of our cannulated pedicle. We then placed 5.5x55 screws bilaterally at L3 and 6.5x55 screws at L4 and 6.5x50 screws in L5.   A subsequent O arm spin was performed and demonstrated the screws in good position.     We then turned  our attention to the decompression and interbody.  A laminotomy was made.  The right L3 pars was scored allowing for the removal of the descending articular process of L3 along with the ascending articular process of L4. This gave us our transforaminal/transfacet decompression of the exiting L3, traversing L4 nerve root.      At the disk space level, we performed a box annulotomy at L3-4, used a combination of ODC curettes, pituitary rongeurs and Kerrison rongeurs to remove the disk material. We took great care not to dig into the endplates. When all of the disk material that we could remove was taken out, we gently scored the endplates. Zheng was packed in the disc space.  We then placed the interbody graft, a 28 mm length and 9 height, Medtronic catalyft cage. This had good fit and fill.  Injectable Zheng was placed through the cage.     We then turned our attention to the decompression and interbody at L4-5.  A laminotomy was made.  The right L4 pars was scored allowing for the removal of the descending articular process of L4 along with the ascending articular process of L5. This gave us our transforaminal/transfacet decompression of the exiting L4, traversing L5 nerve root.      At the disk space level, we performed a box annulotomy at L4-5, used a combination of ODC curettes, pituitary rongeurs and Kerrison rongeurs to remove the disk material. We took great care not to dig into the endplates. When all of the disk material that we could remove was taken out, we gently scored the endplates. Zheng was packed into the disc space.  We then placed the interbody graft,  a 28 mm length and 9 height Medtronic Catalyft cage. This had good fit and fill.        We then performed a complete laminectomy and medial facetectomy.  Thick ligamentum and epidural fat was encountered and removed in a piecemeal fashion to completely free the left lateral recess at L3-4.  And the same procedure was performed at L4-5       We  then decorticated our contralateral facet joint at L3-4 and L4-5 as well as the transverse processes and placed additional autograft in the defect.  This was mixed with Magnetos biologic and additional autograft to assist in posterolateral fusion.       We then placed our connectors and rods and tightened to the 's specifications. We irrigated the wound copiously, had good hemostasis. Vancomycin was placed over the hardware.  The fascia was closed with #1 Vicryl, subcutaneous tissue with 2-0 Vicryl, skin with a 3-0 monocryl.  A sterile dressing was applied. The patient tolerated the procedure well. There were no apparent complications.      She will be weightbearing as tolerated bilateral lower extremities with strict instructions to avoid bending, lifting and twisting over the next 6 weeks. They may have a corset type brace for comfort and have early mobilization and KATE stockings for her DVT prophylaxis. She will have 2 grams of Ancef IV q.8 hours x2 doses for antibiotics or until his drain is removed. Return to see me in 2 weeks, sooner if there are any problems, questions or concerns.     Hardware used:  Medtronic Solera screws and rods with end caps  Medtronic Catalyft cage  Magneto biologics

## 2025-06-03 NOTE — INTERVAL H&P NOTE
"I have reviewed the surgical (or preoperative) H&P that is linked to this encounter, and examined the patient. There are no significant changes    Clinical Conditions Present on Arrival:  Clinically Significant Risk Factors Present on Admission                       # Overweight: Estimated body mass index is 28.06 kg/m  as calculated from the following:    Height as of this encounter: 1.753 m (5' 9\").    Weight as of this encounter: 86.2 kg (190 lb).       "

## 2025-06-03 NOTE — PLAN OF CARE
Goal Outcome Evaluation:      Problem: Delirium  Goal: Optimal Coping  Outcome: Not Progressing   Patient vital signs are at baseline: No, requiring 3L via NC   Patient able to ambulate as they were prior to admission or with assist devices provided by therapies during their stay:  No,  Reason:  Not OOB yet   Patient MUST void prior to discharge:  Yes due to void, buckner in place, not OOB yet   Patient able to tolerate oral intake:  Yes  Pain has adequate pain control using Oral analgesics:  Yes  Does patient have an identified :  Yes  Has goal D/C date and time been discussed with patient:  Yes     PT A0x4, VSS other than one 3L via nc since surgery, NS running 125mL/hr, pain managed with scheduled and PRN medications, PRN Oxycodone utilized this shift and this was effective in managing her pain, still not OOB yet and buckner in place, Hemovac intact, connections taped and draining appropriately, alarms on and audible, call light within reach and utilizes appropriately.

## 2025-06-03 NOTE — ANESTHESIA CARE TRANSFER NOTE
Patient: Stella Guidry    Procedure: Procedure(s):  RIGHT LUMBAR 3-4 AND RIGHT LUMBAR 4-5 TRANSFORAMINAL LUMBAR INTERBODY FUSION WITH LAMINECTOMY WITH STEALTH NAVIGATION       Diagnosis: Spinal stenosis, lumbar [M48.061]  Diagnosis Additional Information: No value filed.    Anesthesia Type:   General     Note:    Oropharynx: oropharynx clear of all foreign objects and spontaneously breathing  Level of Consciousness: awake  Oxygen Supplementation: face mask  Level of Supplemental Oxygen (L/min / FiO2): 10  Independent Airway: airway patency satisfactory and stable  Dentition: dentition unchanged  Vital Signs Stable: post-procedure vital signs reviewed and stable  Report to RN Given: handoff report given  Patient transferred to: PACU    Handoff Report: Identifed the Patient, Identified the Reponsible Provider, Reviewed the pertinent medical history, Discussed the surgical course, Reviewed Intra-OP anesthesia mangement and issues during anesthesia, Set expectations for post-procedure period and Allowed opportunity for questions and acknowledgement of understanding  Vitals:  Vitals Value Taken Time   /76 06/03/25 12:29   Temp 39.4  C (102.9  F) 06/03/25 12:29   Pulse 106 06/03/25 12:29   Resp 15 06/03/25 12:29   SpO2 99 % 06/03/25 12:29       Electronically Signed By: ЮЛИЯ Li CRNA  Aleksandra 3, 2025  12:37 PM

## 2025-06-03 NOTE — ANESTHESIA PROCEDURE NOTES
Airway       Patient location during procedure: OR       Procedure Start/Stop Times: 6/3/2025 7:49 AM  Staff -        CRNA: Howard Ayala APRN CRNA       Performed By: CRNA  Consent for Airway        Urgency: elective  Indications and Patient Condition       Indications for airway management: holly-procedural       Induction type:intravenous       Mask difficulty assessment: 2 - vent by mask + OA or adjuvant +/- NMBA    Final Airway Details       Final airway type: endotracheal airway       Successful airway: ETT - single  Endotracheal Airway Details        ETT size (mm): 7.0       Cuffed: yes       Cuff volume (mL): 10       Successful intubation technique: direct laryngoscopy       DL Blade Type: Altman 2       Grade View of Cords: 1       Adjucts: stylet       Position: Right       Measured from: lips       Secured at (cm): 23       Bite block used: Soft    Post intubation assessment        Placement verified by: capnometry, equal breath sounds and chest rise        Number of attempts at approach: 1       Number of other approaches attempted: 0       Ease of procedure: easy       Dentition: Unchanged       Dental guard used and removed. Dental Guard Type: Standard White.    Medication(s) Administered   Medication Administration Time: 6/3/2025 7:49 AM

## 2025-06-03 NOTE — PROGRESS NOTES
S: Pt is a 75 yof seen at [facility], room 358, for delivery of an off the shelf lumbar sacral orthosis (LSO), ordered by Radha Jamil PA-C.  DX: s/p lumbar fusion surgery  O: 5  9 . 190 lbs.  A: Breg Epic 627 LSO circumference was adjusted to fit the patient appropriately. 2 extension panels used. Donning/doffing and wear/care instructions were discussed with the patient. 's written instructions were provided. Pt was satisfied with fit and function of device. LSO left in pt's room and is to be put on the next time she gets up out of bed.  P: Pt to follow-up as needed. All questions were answered at this time.  G: Provide hydrostatic compression to stabilize surgical site and relieve lumbar pain.    Electronically signed by Shahnaz Medina CPO, JUDY

## 2025-06-03 NOTE — ANESTHESIA POSTPROCEDURE EVALUATION
Patient: Stella Guidry    Procedure: Procedure(s):  RIGHT LUMBAR 3-4 AND RIGHT LUMBAR 4-5 TRANSFORAMINAL LUMBAR INTERBODY FUSION WITH LAMINECTOMY WITH STEALTH NAVIGATION       Anesthesia Type:  General    Note:  Disposition: Inpatient   Postop Pain Control:             Sign Out: Well controlled pain   PONV: No   Neuro/Psych:             Sign Out: Acceptable/Baseline neuro status   Airway/Respiratory:             Sign Out: Acceptable/Baseline resp. status   CV/Hemodynamics:             Sign Out: Acceptable CV status   Other NRE: NONE   DID A NON-ROUTINE EVENT OCCUR?            Last vitals:  Vitals Value Taken Time   /60 06/03/25 13:40   Temp 36.7  C (98.06  F) 06/03/25 13:45   Pulse 79 06/03/25 13:44   Resp 16 06/03/25 13:44   SpO2 96 % 06/03/25 13:45   Vitals shown include unfiled device data.    Electronically Signed By: Kodak Loomis MD  Aleksandra 3, 2025  2:39 PM

## 2025-06-03 NOTE — ANESTHESIA PREPROCEDURE EVALUATION
Anesthesia Pre-Procedure Evaluation    Patient: Stella Guidry   MRN: [id number] : 1950          Procedure : Procedure(s):  RIGHT LUMBAR 3-4 AND RIGHT LUMBAR 4-5 TRANSFORAMINAL LUMBAR INTERBODY FUSION WITH LAMINECTOMY WITH STEALTH NAVIGATION         Past Medical History:   Diagnosis Date    Gastroesophageal reflux disease     Hypertension       Past Surgical History:   Procedure Laterality Date    ARTHROPLASTY SHOULDER Right 2022    Procedure: RIGHT TOTAL SHOULDER ARTHROPLASTY;  Surgeon: Adrien Isbell MD;  Location: [facility] Main OR    ARTHROSCOPY KNEE      BREAST SURGERY      bx    JOINT REPLACEMENT Bilateral     knee    ZZC RECONSTR TOTAL SHOULDER IMPLANT Left 2019    Procedure: LEFT TOTAL SHOULDER ARTHROPLASTY;  Surgeon: Adrien Isbell MD;  Location: [facility] Main OR;  Service: Orthopedics    Z TOTAL KNEE ARTHROPLASTY Right 2018    Procedure: #1 RIGHT TOTAL KNEE ARTHROPLASTY;  Surgeon: Artem Gonzalez MD;  Location: [facility] Main OR;  Service: Orthopedics      No Known Allergies   Social History     Tobacco Use    Smoking status: Former    Smokeless tobacco: Never   Substance Use Topics    Alcohol use: Yes     Alcohol/week: 4.0 standard drinks of alcohol     Comment: Alcoholic Drinks/day: a week      Wt Readings from Last 1 Encounters:   22 90.9 kg (200 lb 4.8 oz)        Anesthesia Evaluation            ROS/MED HX  ENT/Pulmonary:  - neg pulmonary ROS     Neurologic:  - neg neurologic ROS     Cardiovascular:  - neg cardiovascular ROS     METS/Exercise Tolerance:     Hematologic:  - neg hematologic  ROS     Musculoskeletal:  - neg musculoskeletal ROS     GI/Hepatic:  - neg GI/hepatic ROS     Renal/Genitourinary:  - neg Renal ROS     Endo:  - neg endo ROS     Psychiatric/Substance Use:  - neg psychiatric ROS     Infectious Disease:  - neg infectious disease ROS     Malignancy:  - neg malignancy ROS     Other:  - neg other ROS            Physical  "Exam  Airway  Mallampati: II  TM distance: <3 FB  Neck ROM: full    Cardiovascular - normal exam  Rhythm: regular  Rate: normal rate     Dental   (+) Modest Abnormalities - crowns, retainers, 1 or 2 missing teeth      Pulmonary - normal exam      Neurological - normal exam  She appears awake, alert and oriented x3.    Other Findings       OUTSIDE LABS:  CBC:   Lab Results   Component Value Date    HGB 10.3 (L) 02/27/2019    HGB 10.2 (L) 02/21/2018     BMP:   Lab Results   Component Value Date     (H) 01/12/2022     02/27/2019     COAGS: No results found for: \"PTT\", \"INR\", \"FIBR\"  POC: No results found for: \"BGM\", \"HCG\", \"HCGS\"  HEPATIC:   Lab Results   Component Value Date    PROTTOTAL 7.1 02/26/2016     OTHER:   Lab Results   Component Value Date    A1C 6.0 02/21/2018       Anesthesia Plan    ASA Status:  3       Anesthesia Type: General.  Airway: oral.        Consents    Anesthesia Plan(s) and associated risks, benefits, and realistic alternatives discussed. Questions answered and patient/representative(s) expressed understanding.     - Discussed:     - Discussed with:  Patient               Postoperative Care         Comments:                   Kodak Loomis MD    I have reviewed the pertinent notes and labs in the chart from the past 30 days and (re)examined the patient.  Any updates or changes from those notes are reflected in this note.    Clinically Significant Risk Factors Present on Admission                   # Hypertension: Noted on problem list                            "

## 2025-06-04 ENCOUNTER — APPOINTMENT (OUTPATIENT)
Dept: OCCUPATIONAL THERAPY | Facility: CLINIC | Age: 75
DRG: 428 | End: 2025-06-04
Payer: MEDICARE

## 2025-06-04 ENCOUNTER — APPOINTMENT (OUTPATIENT)
Dept: PHYSICAL THERAPY | Facility: CLINIC | Age: 75
DRG: 428 | End: 2025-06-04
Payer: MEDICARE

## 2025-06-04 LAB
GLUCOSE BLDC GLUCOMTR-MCNC: 118 MG/DL (ref 70–99)
GLUCOSE BLDC GLUCOMTR-MCNC: 120 MG/DL (ref 70–99)
GLUCOSE BLDC GLUCOMTR-MCNC: 122 MG/DL (ref 70–99)
GLUCOSE BLDC GLUCOMTR-MCNC: 130 MG/DL (ref 70–99)
GLUCOSE BLDC GLUCOMTR-MCNC: 170 MG/DL (ref 70–99)
HGB BLD-MCNC: 10.4 G/DL (ref 11.7–15.7)
HOLD SPECIMEN: NORMAL
MCV RBC AUTO: 88 FL (ref 78–100)

## 2025-06-04 PROCEDURE — 120N000001 HC R&B MED SURG/OB

## 2025-06-04 PROCEDURE — 36415 COLL VENOUS BLD VENIPUNCTURE: CPT

## 2025-06-04 PROCEDURE — 97161 PT EVAL LOW COMPLEX 20 MIN: CPT | Mod: GP

## 2025-06-04 PROCEDURE — 97116 GAIT TRAINING THERAPY: CPT | Mod: GP

## 2025-06-04 PROCEDURE — 97535 SELF CARE MNGMENT TRAINING: CPT | Mod: GO

## 2025-06-04 PROCEDURE — 250N000013 HC RX MED GY IP 250 OP 250 PS 637: Performed by: ORTHOPAEDIC SURGERY

## 2025-06-04 PROCEDURE — 99223 1ST HOSP IP/OBS HIGH 75: CPT | Mod: AI | Performed by: HOSPITALIST

## 2025-06-04 PROCEDURE — 97165 OT EVAL LOW COMPLEX 30 MIN: CPT | Mod: GO

## 2025-06-04 PROCEDURE — 250N000011 HC RX IP 250 OP 636

## 2025-06-04 PROCEDURE — 250N000013 HC RX MED GY IP 250 OP 250 PS 637

## 2025-06-04 PROCEDURE — 250N000013 HC RX MED GY IP 250 OP 250 PS 637: Performed by: HOSPITALIST

## 2025-06-04 PROCEDURE — 85018 HEMOGLOBIN: CPT

## 2025-06-04 RX ORDER — ACETAMINOPHEN 325 MG/1
975 TABLET ORAL EVERY 8 HOURS
Qty: 100 TABLET | Refills: 0 | Status: SHIPPED | OUTPATIENT
Start: 2025-06-04

## 2025-06-04 RX ORDER — CALCIUM CARBONATE 500 MG/1
500 TABLET, CHEWABLE ORAL 3 TIMES DAILY PRN
Status: DISPENSED | OUTPATIENT
Start: 2025-06-04

## 2025-06-04 RX ORDER — OXYCODONE HYDROCHLORIDE 5 MG/1
2.5-5 TABLET ORAL EVERY 4 HOURS PRN
Qty: 32 TABLET | Refills: 0 | Status: SHIPPED | OUTPATIENT
Start: 2025-06-04

## 2025-06-04 RX ORDER — AMOXICILLIN 250 MG
1 CAPSULE ORAL 2 TIMES DAILY
Qty: 20 TABLET | Refills: 0 | Status: SHIPPED | OUTPATIENT
Start: 2025-06-04

## 2025-06-04 RX ORDER — ASPIRIN 81 MG/1
81 TABLET ORAL EVERY EVENING
COMMUNITY
Start: 2025-06-09

## 2025-06-04 RX ADMIN — ACETAMINOPHEN 975 MG: 325 TABLET ORAL at 06:18

## 2025-06-04 RX ADMIN — GABAPENTIN 600 MG: 300 CAPSULE ORAL at 21:00

## 2025-06-04 RX ADMIN — BENZOCAINE AND MENTHOL 1 LOZENGE: 15; 3.6 LOZENGE ORAL at 00:02

## 2025-06-04 RX ADMIN — OXYCODONE 5 MG: 5 TABLET ORAL at 13:30

## 2025-06-04 RX ADMIN — ATENOLOL 100 MG: 50 TABLET ORAL at 21:00

## 2025-06-04 RX ADMIN — CALCIUM CARBONATE (ANTACID) CHEW TAB 500 MG 500 MG: 500 CHEW TAB at 20:56

## 2025-06-04 RX ADMIN — OXYCODONE 5 MG: 5 TABLET ORAL at 19:19

## 2025-06-04 RX ADMIN — OXYCODONE 5 MG: 5 TABLET ORAL at 08:00

## 2025-06-04 RX ADMIN — CALCIUM 500 MG: 500 TABLET ORAL at 08:00

## 2025-06-04 RX ADMIN — CEFAZOLIN SODIUM 2 G: 2 SOLUTION INTRAVENOUS at 03:46

## 2025-06-04 RX ADMIN — ACETAMINOPHEN 975 MG: 325 TABLET ORAL at 23:00

## 2025-06-04 RX ADMIN — POLYETHYLENE GLYCOL 3350 17 G: 17 POWDER, FOR SOLUTION ORAL at 08:00

## 2025-06-04 RX ADMIN — SIMVASTATIN 40 MG: 40 TABLET, FILM COATED ORAL at 21:00

## 2025-06-04 RX ADMIN — MULTIVITAMIN TABLET 1 TABLET: TABLET at 08:00

## 2025-06-04 RX ADMIN — Medication 200 MG: at 08:00

## 2025-06-04 RX ADMIN — OXYCODONE 5 MG: 5 TABLET ORAL at 04:34

## 2025-06-04 RX ADMIN — ACETAMINOPHEN 975 MG: 325 TABLET ORAL at 15:09

## 2025-06-04 RX ADMIN — PANTOPRAZOLE SODIUM 40 MG: 40 TABLET, DELAYED RELEASE ORAL at 06:19

## 2025-06-04 RX ADMIN — SENNOSIDES AND DOCUSATE SODIUM 1 TABLET: 50; 8.6 TABLET ORAL at 08:00

## 2025-06-04 RX ADMIN — Medication 25 MCG: at 08:00

## 2025-06-04 RX ADMIN — SENNOSIDES AND DOCUSATE SODIUM 1 TABLET: 50; 8.6 TABLET ORAL at 21:00

## 2025-06-04 ASSESSMENT — ACTIVITIES OF DAILY LIVING (ADL)
ADLS_ACUITY_SCORE: 33
ADLS_ACUITY_SCORE: 34
ADLS_ACUITY_SCORE: 33
ADLS_ACUITY_SCORE: 32
ADLS_ACUITY_SCORE: 33
ADLS_ACUITY_SCORE: 32
ADLS_ACUITY_SCORE: 33
ADLS_ACUITY_SCORE: 34
ADLS_ACUITY_SCORE: 33

## 2025-06-04 NOTE — CONSULTS
[facility]  Consult Note - Hospitalist Service  Date of Admission:  6/3/2025  Consult Requested by: Orthopedics   Reason for Consult: Postop medical cares    Assessment & Plan   Stella Guidry is a 75 year old old female with a history of DM2, HTN, GERD, HLD, spine disease underwent lumbar spine surgery. [facility] service was asked to evaluate patient for postoperative medical management. Please resume the home medications as reconciled and further noted below with ordered hold parameters.  Vital signs have been stable post-operatively including hemodynamically stable blood pressure and heart rate. Thank you for this consult; we will continue to follow this patient until discharge.    HTN  -Order home medications with the written tiered hold parameters given risk for post-operative hypotension. \     DM2  -Hold home oral anti-glycemics post-op given risk for acute hypoglycemia with sulfonylureas and nephrotoxicity if contrast is utilized in any emergent imaging with biguanides; utilize insulin corrections three times a day and at bedtime. Restart home oral anti-glycemics step-wise as tolerated once patient is eating as per their normal, usual diet.  -Carb-controlled/diabetic diet.      HLD  -Order home statin.    GERD  -Order home antacids/medications. Utilize formulary while inpatient.     S/p Lumbar Surgery  Spine Disease  Acute Post-Op Pain  -Agree with current pain control regimen; consider acute pain team consultation if increasingly difficult to control or proves refractory.   -PT evaluation.   -OT evaluation.  -IS frequently, initially every hour while awake as tolerated. Directly encouraged and discussed.      Post-Op DVT Prophylaxis  -As per primary surgery team.      Procedure(s):  RIGHT LUMBAR 3-4 AND RIGHT LUMBAR 4-5 TRANSFORAMINAL LUMBAR INTERBODY FUSION WITH LAMINECTOMY WITH STEALTH NAVIGATION  Post-operative Day: 1 Day Post-Op  Code status:Full Code     Estimated Blood Loss:  200  "    Hospital Problem List   No problem-specific Assessment & Plan notes found for this encounter.    Active Problems:    Essential hypertension    Type 2 diabetes mellitus without complication, without long-term current use of insulin (H)    Dyslipidemia    Status post lumbar spinal fusion      -Reviewed the patient's preoperative H and P and updated missing elements.  -Home medication reconciliation has been reviewed.  Medications have been ordered as noted from the home list and changes are documented above        Clinically Significant Risk Factors Present on Admission                 # Drug Induced Platelet Defect: home medication list includes an antiplatelet medication   # Hypertension: Noted on problem list           # Overweight: Estimated body mass index is 28.06 kg/m  as calculated from the following:    Height as of this encounter: 1.753 m (5' 9\").    Weight as of this encounter: 86.2 kg (190 lb).              Christopher Unger MD  Hospitalist Service  Securely message with [employer] (more info)  Text page via [facility] Paging/Directory   ______________________________________________________________________    Chief Complaint   Postop medical cares    History is obtained from the patient and EMR    History of Present Illness Stella Guidry is a 75 year old old female with a history of DM2, HTN, GERD, HLD, spine disease underwent lumbar spine surgery. [facility] service was asked to evaluate patient for postoperative medical management.    She has been in her usual state of health prior to presenting for this elective surgery.  She denies any associated symptoms prior to coming in today, and also denies any recent travel domestically or internationally, and also denies any recent sick contacts around her including any family or friends who have been sick.  When asked, she denies any fevers, rigors, chest pain or shortness of breath, nausea or vomiting or abdominal pain, changes in bowel movements/pattern, urinary " symptoms, focal weakness, or any other new and associated symptoms at this time.  She has been compliant with her medications.  14 point review of systems performed with the patient without any other pertinent positives at this time.    Presently, she states her postop surgical pain is currently well-tolerated.  She denies any new complaints or symptoms at this time.    All questions she had at this time were answered.       Past Medical History    Past Medical History:   Diagnosis Date    Gastroesophageal reflux disease     Hypertension        Past Surgical History   Past Surgical History:   Procedure Laterality Date    ARTHROPLASTY SHOULDER Right 1/12/2022    Procedure: RIGHT TOTAL SHOULDER ARTHROPLASTY;  Surgeon: Adrien Isbell MD;  Location: [facility]    ARTHROSCOPY KNEE      BREAST SURGERY      bx    JOINT REPLACEMENT Bilateral     knee    [facility] RECONSTR TOTAL SHOULDER IMPLANT Left 2/26/2019    Procedure: LEFT TOTAL SHOULDER ARTHROPLASTY;  Surgeon: Adrien Isbell MD;  Location: [facility];  Service: Orthopedics    [facility] TOTAL KNEE ARTHROPLASTY Right 2/20/2018    Procedure: #1 RIGHT TOTAL KNEE ARTHROPLASTY;  Surgeon: Artem Gonzalez MD;  Location: [facility];  Service: Orthopedics       Medications   I have reviewed this patient's current medications  Medications Prior to Admission   Medication Sig Dispense Refill Last Dose/Taking    acetaminophen (TYLENOL) 500 MG tablet Take 1,000 mg by mouth 2 times daily.   6/2/2025 Bedtime    aspirin 81 MG EC tablet Take 81 mg by mouth every evening.   Past Week    atenolol (TENORMIN) 100 MG tablet Take 100 mg by mouth every evening.   6/2/2025 Bedtime    Boswellia Maikel (BOSWELLIA PO) Take 1 tablet by mouth every evening.   Past Week    calcium carbonate (OS-JAYJAY) 500 MG tablet Take 1 tablet by mouth 2 times daily.   Past Week    clotrimazole (LOTRIMIN) 1 % external cream Apply topically 2 times daily as needed   6/2/2025 Morning    coenzyme  Q10 (CO Q-10) 300 mg cap [COENZYME Q10 (CO Q-10) 300 MG CAP] Take 1 capsule by mouth daily.   Past Week    cranberry 200 MG CAPS capsule Take 200 mg by mouth daily.   Past Week    gabapentin (NEURONTIN) 300 MG capsule Take 600 mg by mouth at bedtime.   6/2/2025 Bedtime    losartan-hydrochlorothiazide (HYZAAR) 50-12.5 MG tablet Take 1 tablet by mouth daily.   6/2/2025 Morning    Magnesium Citrate 100 MG TABS Take 1 tablet by mouth 2 times daily.   Past Week    metFORMIN (GLUCOPHAGE) 500 MG tablet [METFORMIN (GLUCOPHAGE) 500 MG TABLET] Take 500 mg by mouth 2 (two) times a day with meals.   6/2/2025 Morning    Multiple Vitamins-Minerals (IPRIFLAVONE OSTEO FORMULA PO) Take 600 mg by mouth every evening.   Past Week    multivitamin therapeutic tablet Take 1 tablet by mouth daily. [employer] Multiple brand   Past Week    nitroFURantoin macrocrystal-monohydrate (MACROBID) 100 MG capsule Take 100 mg by mouth as needed Take 30 min before sexual activity.   Unknown    omeprazole (PRILOSEC) 20 MG capsule Take 20 mg by mouth daily    6/3/2025 Morning    PAPAYA ENZYMES PO Take 1 tablet by mouth every evening.   Past Week    Probiotic Product (PROBIOTIC BLEND PO) Take 1 capsule by mouth daily. [employer] Promote brand   Past Week    simvastatin (ZOCOR) 40 MG tablet [SIMVASTATIN (ZOCOR) 40 MG TABLET] Take 40 mg by mouth at bedtime.   6/2/2025 Bedtime    sulfacetamide sodium, Acne, 10 % lotion Apply topically 2 times daily.   6/3/2025 Morning    Theanine (THEANINE 200) 200 MG CAPS Take 200 mg by mouth every evening.   Past Week    tretinoin (RETIN-A) 0.025 % cream Apply 1 Application. topically at bedtime.   6/2/2025 Bedtime    UNABLE TO FIND Take 2 capsules by mouth daily. MEDICATION NAME: [employer] Defend (D3, bran, green tea extract, tumeric   Past Week    UNABLE TO FIND Take 1 tablet by mouth 2 times daily. MEDICATION NAME: D-mannose   Past Week    UNABLE TO FIND Take 1 tablet by mouth 2 times daily. MEDICATION NAME: Qunol Tumeric  Aundrea   Past Week    vitamin C with B complex (B COMPLEX-C) tablet Take 1 tablet by mouth daily. 100mg   Past Week    cholecalciferol 25 MCG (1000 UT) TABS Take 25 mcg by mouth daily.                Physical Exam   Vital Signs: Temp: 97.6  F (36.4  C) Temp src: Oral BP: (!) 160/71 Pulse: 77   Resp: 16 SpO2: 95 % O2 Device: None (Room air) Oxygen Delivery: 3 LPM  Weight: 190 lbs 0 oz    GENERAL:  Alert, appears comfortable, in no acute distress, appears stated age, on ambient room air   HEAD:  Normocephalic, without obvious abnormality, atraumatic   EYES:  Conjunctiva/corneas clear, no scleral icterus, EOM's appears intact grossly   NOSE: Nares normal, septum midline, mucosa normal, no drainage   THROAT: Lips, mucosa, and tongue appear normal   NECK: Symmetrical, trachea appears midline   BACK:   Symmetric, no curvature noted   LUNGS:   Symmetric chest rise on inhalation, respirations unlabored   CHEST WALL:  No apparent deformity   HEART:  No thrills or heaves visualized   ABDOMEN:   No masses or organomegaly apparent; non-scaphoid   EXTREMITIES: Extremities appear normal, atraumatic, no cyanosis   SKIN: No exanthems in the visualized areas   NEURO: Alert and oriented x4, moves all four extremities freely and spontaneously   PSYCH: Cooperative, behavior is appropriate         Medical Decision Making       81 MINUTES SPENT BY ME on the date of service doing chart review, history, exam, documentation & further activities per the note.      Data     I have personally reviewed the following data over the past 24 hrs:    N/A  \   10.4 (L)   / N/A     N/A N/A N/A /  122 (H)   N/A N/A N/A \       Imaging results reviewed over the past 24 hrs:   Recent Results (from the past 24 hours)   XR Surgery [facility]    Narrative    This exam was marked as non-reportable because it will not be read by a   radiologist or a [place] non-radiologist provider.

## 2025-06-04 NOTE — PROGRESS NOTES
"   06/04/25 0924   Appointment Info   Signing Clinician's Name / Credentials (PT) Fatoumata Roberts, PT, DPT   Living Environment   People in Home spouse  (spouse has memory issues, pt main caregiver to spouse)   Current Living Arrangements condominium   Home Accessibility no concerns  (no stairs to access condo)   Transportation Anticipated family or friend will provide   Living Environment Comments pt plans to have other family assist, has small loft area with 2 steps with L railing-area to watch TV, recliners   Self-Care   Usual Activity Tolerance moderate   Current Activity Tolerance moderate   Equipment Currently Used at Home grab bar, toilet;grab bar, tub/shower;commode chair;shower chair   Fall history within last six months yes   Number of times patient has fallen within last six months 1   Activity/Exercise/Self-Care Comment pt reports has 2 4WWs, using a walker since March 2025 does her own grocery shopping, pt was doing PT right up to surgery, doing an upright bike x 30 min/day.   General Information   Onset of Illness/Injury or Date of Surgery 06/03/25   Referring Physician Radha Jamil, EDUARDO   Patient/Family Therapy Goals Statement (PT) to go home   Pertinent History of Current Problem (include personal factors and/or comorbidities that impact the POC) 76 y/o F POD #1 RIGHT LUMBAR 3-4 AND RIGHT LUMBAR 4-5 TRANSFORAMINAL LUMBAR INTERBODY FUSION WITH LAMINECTOMY WITH STEALTH NAVIGATION. PMHx: DM II, Essential Hypertension.     Per surgical note,\"She will be weightbearing as tolerated bilateral lower extremities with strict instructions to avoid bending, lifting and twisting over the next 6 weeks. They may have a corset type brace for comfort and have early mobilization and KATE stockings for her DVT prophylaxis.\"   Existing Precautions/Restrictions spinal   Weight-Bearing Status - LLE weight-bearing as tolerated   Weight-Bearing Status - RLE weight-bearing as tolerated   General Observations pt resting in " "bed, NAD   Cognition   Affect/Mental Status (Cognition) WFL   Orientation Status (Cognition) oriented to;person;place;situation   Pain Assessment   Patient Currently in Pain Yes, see Vital Sign flowsheet  (at rest, \"pretty good\", reports 6-7/10 getting in/out of bed)   Integumentary/Edema   Integumentary/Edema Comments hemovac drain   Range of Motion (ROM)   ROM Comment limited spinal mobility-surgery, spinal precautions   Strength (Manual Muscle Testing)   Strength Comments generalized weakness, able to move ext x 4 against gravity   Bed Mobility   Comment, (Bed Mobility) NT-pt sitting in recliner   Transfers   Comment, (Transfers) sit to stand with CGA with 4WW   Gait/Stairs (Locomotion)   [place] Level (Gait) contact guard   Assistive Device (Gait) walker, 4-wheeled   Distance in Feet (Gait) 20ft   Pattern (Gait) step-through   Deviations/Abnormal Patterns (Gait) marcelle decreased;gait speed decreased   Balance   Balance Comments CGA with dynamic standing with UE support on the 4WW   Sensory Examination   Sensory Perception patient reports no sensory changes   Clinical Impression   Criteria for Skilled Therapeutic Intervention Yes, treatment indicated   PT Diagnosis (PT) impaired gait   Influenced by the following impairments pain, impaired activity tolerance, impaired balance, generalized weakness   Functional limitations due to impairments impaired IND with functional mobility   Clinical Presentation (PT Evaluation Complexity) stable   Clinical Presentation Rationale clinical judgement   Clinical Decision Making (Complexity) low complexity   Planned Therapy Interventions (PT) balance training;gait training;bed mobility training;home exercise program;patient/family education;stair training;strengthening;transfer training;progressive activity/exercise   Risk & Benefits of therapy have been explained evaluation/treatment results reviewed;care plan/treatment goals reviewed;risks/benefits " reviewed;current/potential barriers reviewed;patient;participants included   PT Total Evaluation Time   PT Eval, Low Complexity Minutes (77643) 10   Physical Therapy Goals   PT Frequency Daily   PT Predicted Duration/Target Date for Goal Attainment 06/07/25   PT Goals Transfers;Gait;Stairs   PT: Transfers Modified independent;Bed to/from chair;Sit to/from stand;Assistive device;Within precautions  (spinal precautions)   PT: Gait Modified independent;Rolling walker;150 feet   PT: Stairs Supervision/stand-by assist;4 stairs;Rail on left   Interventions   Interventions Quick Adds Gait Training;Therapeutic Activity   Therapeutic Activity   Therapeutic Activities: dynamic activities to improve functional performance Minutes (87517) 6   Symptoms Noted During/After Treatment Increased pain   Treatment Detail/Skilled Intervention Pt reported more pain when lying down with the LSO, discussed orders for comfort, recommending doffing brace at EOB prior to lying down, verbally reviewed sit to supine transition for log rolling, pt aware of spinal precautions. adjusted LSO with once standing due to riding up. cues for sit<>stand transition for technique with CGA. discussed placing items in front of her to avoid twisting. Pt left in recliner with needs in reach and chair alarm on.   Gait Training   Gait Training Minutes (68609) 10   Symptoms Noted During/After Treatment (Gait Training) fatigue;increased pain   Treatment Detail/Skilled Intervention Amb with 4WW, CGA progressing to SBA, step through pattern, increased posterior sway with stopping in moraes with talking, no gross LOB, denied lightheadedness. reviewed step to pattern, B railing up/down 4 steps with min A. educated pt on walking program for home.   Distance in Feet 200ft   PT Discharge Planning   PT Plan stairs with 1 railing, review transfers and amb with 4WW   PT Discharge Recommendation (DC Rec) home with assist   PT Rationale for DC Rec Pt moving with CGA/SBA with  4WW, reports good support at home and good house set up. Anticipate pt will be able to d/c home when medically stable.   PT Brief overview of current status see above   PT Total Distance Amb During Session (feet) 220   Physical Therapy Time and Intention   Timed Code Treatment Minutes 16   Total Session Time (sum of timed and untimed services) 26

## 2025-06-04 NOTE — PLAN OF CARE
Goal Outcome Evaluation:      Problem: Delirium  Goal: Optimal Coping  Outcome: Progressing   Patient vital signs are at baseline: Yes  Patient able to ambulate as they were prior to admission or with assist devices provided by therapies during their stay:  Yes  Patient MUST void prior to discharge:  Yes  Patient able to tolerate oral intake:  Yes  Pain has adequate pain control using Oral analgesics:  Yes  Does patient have an identified :  Yes  Has goal D/C date and time been discussed with patient:  Yes    PT A0x4, VSS on RA, pain managed with scheduled, PRN medications and ice, hemovac intact, taped and draining, patient voiding, passing gas and ambulating SBA with walker and GB, dressing CDI, brace when OOB. Alarms on and audible, call light within reach and utilized appropriately.

## 2025-06-04 NOTE — PLAN OF CARE
Patient vital signs are at baseline: Yes  Patient able to ambulate as they were prior to admission or with assist devices provided by therapies during their stay:  Yes  Patient MUST void prior to discharge:  Yes  Patient able to tolerate oral intake:  Yes  Pain has adequate pain control using Oral analgesics:  Yes  Does patient have an identified :  Yes  Has goal D/C date and time been discussed with patient:  Yes    VSS on RA. A&Ox4. Sidhu removed and passed 3 PVRs, passed gas. Assist of 1 with walker (home walker), gait belt. Hemovac in place and draining 200 ml in first 8 hours. Output from [id number] was 100 ml. Dressing is CDI.  Regular diet. Discharge pending therapies at 9:15 and drain status. CMS intact. Call light within reach and uses appropriately.

## 2025-06-04 NOTE — PROGRESS NOTES
"  Spine Surgery Progress Note    POD #: 1 Day Post-Op    Assessment:  S/P Procedure(s):  RIGHT LUMBAR 3-4 AND RIGHT LUMBAR 4-5 TRANSFORAMINAL LUMBAR INTERBODY FUSION WITH STEALTH NAVIGATION  Complete laminectomy at Lumbar 3-4 and Lumbar 4-5 on 6/3/2025    Plan:  - Continue PT/OT, appreciate recs  - Ambulate as tolerated  - Sidhu removed per protocol and has passed 3 PVRs  - Pain Management continue current regimen  - Anticoagulation: SCDs, johnathon stockings and early ambulation.  - Diet: progress diet as tolerated  - Labs: hgb 10.4 on 6/4/2025, transfuse if <7.0. 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 Caal's Team in 2 weeks  - Disposition: Patient plans to d/c to home pending HV drain output, medical clearance, pain control, and therapy recommendations     Subjective:    Appears comfortable. Reports pain only in incisional area, managed with pain regimen at this time. States PT and OT went well this morning.   Lower extremity pain improved compared with prior to surgery. Notes weakness into bilateral extremities prior to surgery which has improved.   No new extremity pain, numbness, tingling, or weakness to report  Nausea, Vomiting:  No  Lightheadedness, Dizziness:  No  Flatus: Yes  BM: No    Sidhu removed. Voiding independently without difficulty.   HV drain in place    Reports good support system when she eventually discharges.  All questions/concerns answered.    Objective:  /60 (BP Location: Right arm)   Pulse 75   Temp 98.1  F (36.7  C) (Oral)   Resp 16   Ht 1.753 m (5' 9\")   Wt 86.2 kg (190 lb)   SpO2 94%   BMI 28.06 kg/m        Incision covered with dressing. Gauze dressing in place at low back. Appears clean, dry, intact.  Awake, alert and oriented. Patient is sitting up at bedside. Conversational  Non-labored " "breathing    RLE   5/5 hip flexors  5/5 quadriceps  5/5 tibialis anterior  5/5 EHL  5/5 Gastroc Soleus  RLE L2-S1 intact to light touch    LLE   5/5 hip flexors  5/5 quadriceps  5/5 tibialis anterior  5/5 EHL  5/5 Gastroc Soleus  LLE L2-S1 intact to light touch    Bilateral calves non-tender, non-edematous, non-erythematous, no warmth    Drain in place, without signs of infection: output 100mL overnight      Labs:  Recent Labs   Lab Test 06/04/25  0716 02/27/19  0546   HGB 10.4* 10.3*   MCV 88  --      No lab results found.    Invalid input(s): \"SODIUM\", \"HY2TWAGH\", \"BUNCRERATIO\", \"CALCIUM\"      Beth Romo PA-C/ Dr. Caal  Date: 06/04/2025  [facility]   "

## 2025-06-04 NOTE — CONSULTS
CARE MANAGEMENT NOTE:    Care management team consulted for discharge planning. Care manager reviewed patients chart, discussed patient at unit rounds, and discussed discharge needs with provider. No discharge needs identified at this time.     No further care management intervention anticipated at this time.  Please re-consult if further needs arise.  Care management signing off.      ARMANDO Alvarez  6/4/2025 10:36 AM

## 2025-06-04 NOTE — PROGRESS NOTES
Occupational Therapy Discharge Summary    Reason for therapy discharge:    All goals and outcomes met, no further needs identified.    Progress towards therapy goal(s). See goals on Care Plan in [facility] electronic health record for goal details.  Goals met    Therapy recommendation(s):    No further therapy is recommended.       06/04/25 0820   Appointment Info   Signing Clinician's Name / Credentials (OT) Kesha Altman, OTR/L   Living Environment   People in Home spouse   Current Living Arrangements condominium   Living Environment Comments flat bed, walk-in shower, regular height toilet   Self-Care   Equipment Currently Used at Home grab bar, toilet;grab bar, tub/shower;commode chair;shower chair  (reacher)   Activity/Exercise/Self-Care Comment IND for BADLs   Instrumental Activities of Daily Living (IADL)   IADL Comments IND for IADLs but cleaning assist, caregiver to spouse d/t his memory deficits   General Information   Onset of Illness/Injury or Date of Surgery 06/03/25   Referring Physician Radha Jamil PA-C   Patient/Family Therapy Goal Statement (OT) go home   Additional Occupational Profile Info/Pertinent History of Current Problem s/p L3-5 fusion   Existing Precautions/Restrictions spinal  (LSO brace for comfort)   Cognitive Status Examination   Affect/Mental Status (Cognitive) WFL   Follows Commands WFL   Pain Assessment   Patient Currently in Pain Yes, see Vital Sign flowsheet   Range of Motion Comprehensive   General Range of Motion no range of motion deficits identified   Strength Comprehensive (MMT)   Comment, General Manual Muscle Testing (MMT) Assessment WFL   Bed Mobility   Bed Mobility supine-sit;sit-supine   Supine-Sit Boundary (Bed Mobility) minimum assist (75% patient effort);verbal cues   Sit-Supine Boundary (Bed Mobility) minimum assist (75% patient effort);verbal cues   Transfers   Transfers sit-stand transfer;toilet transfer;shower transfer   Sit-Stand Transfer   Sit-Stand  [place] (Transfers) contact guard;verbal cues   Shower Transfer   Type (Shower Transfer) lateral   [place] Level (Shower Transfer) contact guard;verbal cues   Toilet Transfer   Type (Toilet Transfer) sit-stand;stand-sit   [place] Level (Toilet Transfer) contact guard;verbal cues   Assistive Device (Toilet Transfer) commode chair   Activities of Daily Living   BADL Assessment/Intervention lower body dressing;toileting;upper body dressing   Upper Body Dressing Assessment/Training   [place] Level (Upper Body Dressing) moderate assist (50% patient effort)   Comment, (Upper Body Dressing) to don brace   Lower Body Dressing Assessment/Training   [place] Level (Lower Body Dressing) moderate assist (50% patient effort)   Toileting   [place] Level (Toileting) minimum assist (75% patient effort)   Clinical Impression   Criteria for Skilled Therapeutic Interventions Met (OT) Yes, treatment indicated   OT Diagnosis dec BADL IND   OT Problem List-Impairments impacting ADL problems related to;activity tolerance impaired;mobility;pain;post-surgical precautions   Assessment of Occupational Performance 5 or more Performance Deficits   Identified Performance Deficits dressing, toileting, bathing, household mobility, functional transfers, household management, meal prep, community mobility   Planned Therapy Interventions (OT) ADL retraining;bed mobility training;transfer training;home program guidelines;progressive activity/exercise   Clinical Decision Making Complexity (OT) problem focused assessment/low complexity   Risk & Benefits of therapy have been explained evaluation/treatment results reviewed;participants included;patient   OT Total Evaluation Time   OT Eval, Low Complexity Minutes (47445) 10   OT Goals   Therapy Frequency (OT) One time eval and treatment   OT Predicted Duration/Target Date for Goal Attainment 06/04/25   OT Goals Lower Body Dressing;Bed Mobility;Transfers;Toilet  Transfer/Toileting   OT: Lower Body Dressing Supervision/stand-by assist;using adaptive equipment;within precautions   OT: Bed Mobility Independent;supine to/from sitting;within precautions   OT: Transfer Supervision/stand-by assist;within precautions  (walk-in shower)   OT: Toilet Transfer/Toileting Supervision/stand-by assist;toilet transfer;cleaning and garment management;within precautions   Self-Care/Home Management   Self-Care/Home Mgmt/ADL, Compensatory, Meal Prep Minutes (22526) 48   Symptoms Noted During/After Treatment (Meal Preparation/Planning Training) increased pain   Treatment Detail/Skilled Intervention Educ on positioning and technique to maintain spine precautions during LB dressing, brace dressing, toileting/transfers, bathing/transfers, bed mob, car transfers, and general body mechanics. Following educ and with initial cues, setup to don/doff underwear and socks using figure 4, setup to don tie shoes using LHSH and figure 4, setup to don brace while standing and adjust, SBA commode over toilet transfer and pericares while standing, SBA walk-in shower transfer using GB simulating ome setup, supervision standing hand hygiene, SBA with 4WW and transfers. Extended time for tasks d/t pt asking appropriate questions and open to trial of alternate techniques to increase ease. Demos understanding for car transfer tech. Discussed consideration of bidet to increase ease of pericares. Provided with ADL spine handout.   OT Discharge Planning   OT Plan d/c   OT Discharge Recommendation (DC Rec) home with assist   OT Rationale for DC Rec 2 kids staying with pt and spouse to assist for 1 month. met OT goals.   OT Brief overview of current status MOD IND/SBA   OT Total Distance Amb During Session (feet) 40   Total Session Time   Timed Code Treatment Minutes 48   Total Session Time (sum of timed and untimed services) 58

## 2025-06-05 ENCOUNTER — APPOINTMENT (OUTPATIENT)
Dept: PHYSICAL THERAPY | Facility: CLINIC | Age: 75
DRG: 428 | End: 2025-06-05
Attending: ORTHOPAEDIC SURGERY
Payer: MEDICARE

## 2025-06-05 VITALS
TEMPERATURE: 97.5 F | HEIGHT: 69 IN | WEIGHT: 190 LBS | OXYGEN SATURATION: 96 % | BODY MASS INDEX: 28.14 KG/M2 | RESPIRATION RATE: 18 BRPM | HEART RATE: 88 BPM | DIASTOLIC BLOOD PRESSURE: 75 MMHG | SYSTOLIC BLOOD PRESSURE: 164 MMHG

## 2025-06-05 LAB
GLUCOSE BLDC GLUCOMTR-MCNC: 104 MG/DL (ref 70–99)
GLUCOSE BLDC GLUCOMTR-MCNC: 108 MG/DL (ref 70–99)
GLUCOSE BLDC GLUCOMTR-MCNC: 114 MG/DL (ref 70–99)
GLUCOSE BLDC GLUCOMTR-MCNC: 174 MG/DL (ref 70–99)
GLUCOSE BLDC GLUCOMTR-MCNC: 98 MG/DL (ref 70–99)
HGB BLD-MCNC: 9.7 G/DL (ref 11.7–15.7)
HOLD SPECIMEN: NORMAL
MCV RBC AUTO: 89 FL (ref 78–100)

## 2025-06-05 PROCEDURE — 250N000013 HC RX MED GY IP 250 OP 250 PS 637: Performed by: ANESTHESIOLOGY

## 2025-06-05 PROCEDURE — 97116 GAIT TRAINING THERAPY: CPT | Mod: GP

## 2025-06-05 PROCEDURE — 97110 THERAPEUTIC EXERCISES: CPT | Mod: GP

## 2025-06-05 PROCEDURE — 120N000001 HC R&B MED SURG/OB

## 2025-06-05 PROCEDURE — 97530 THERAPEUTIC ACTIVITIES: CPT | Mod: GP

## 2025-06-05 PROCEDURE — 250N000013 HC RX MED GY IP 250 OP 250 PS 637

## 2025-06-05 PROCEDURE — 250N000013 HC RX MED GY IP 250 OP 250 PS 637: Performed by: HOSPITALIST

## 2025-06-05 PROCEDURE — 85018 HEMOGLOBIN: CPT

## 2025-06-05 PROCEDURE — 250N000013 HC RX MED GY IP 250 OP 250 PS 637: Performed by: ORTHOPAEDIC SURGERY

## 2025-06-05 PROCEDURE — 99231 SBSQ HOSP IP/OBS SF/LOW 25: CPT | Performed by: HOSPITALIST

## 2025-06-05 PROCEDURE — 36415 COLL VENOUS BLD VENIPUNCTURE: CPT

## 2025-06-05 RX ORDER — ACETAMINOPHEN 325 MG/1
650 TABLET ORAL EVERY 4 HOURS PRN
Qty: 100 TABLET | Refills: 0 | Status: SHIPPED | OUTPATIENT
Start: 2025-06-05

## 2025-06-05 RX ADMIN — OXYCODONE 5 MG: 5 TABLET ORAL at 05:06

## 2025-06-05 RX ADMIN — ATENOLOL 100 MG: 50 TABLET ORAL at 21:20

## 2025-06-05 RX ADMIN — SENNOSIDES AND DOCUSATE SODIUM 1 TABLET: 50; 8.6 TABLET ORAL at 08:14

## 2025-06-05 RX ADMIN — GABAPENTIN 600 MG: 300 CAPSULE ORAL at 21:20

## 2025-06-05 RX ADMIN — POLYETHYLENE GLYCOL 3350 17 G: 17 POWDER, FOR SOLUTION ORAL at 08:14

## 2025-06-05 RX ADMIN — OXYCODONE 5 MG: 5 TABLET ORAL at 09:05

## 2025-06-05 RX ADMIN — OXYCODONE 5 MG: 5 TABLET ORAL at 17:46

## 2025-06-05 RX ADMIN — PANTOPRAZOLE SODIUM 40 MG: 40 TABLET, DELAYED RELEASE ORAL at 05:06

## 2025-06-05 RX ADMIN — CALCIUM 500 MG: 500 TABLET ORAL at 08:14

## 2025-06-05 RX ADMIN — CALCIUM 500 MG: 500 TABLET ORAL at 21:20

## 2025-06-05 RX ADMIN — SENNOSIDES AND DOCUSATE SODIUM 1 TABLET: 50; 8.6 TABLET ORAL at 21:20

## 2025-06-05 RX ADMIN — Medication 25 MCG: at 08:14

## 2025-06-05 RX ADMIN — CALCIUM CARBONATE (ANTACID) CHEW TAB 500 MG 500 MG: 500 CHEW TAB at 21:16

## 2025-06-05 RX ADMIN — ACETAMINOPHEN 975 MG: 325 TABLET ORAL at 05:06

## 2025-06-05 RX ADMIN — SIMVASTATIN 40 MG: 40 TABLET, FILM COATED ORAL at 21:20

## 2025-06-05 RX ADMIN — OXYCODONE 10 MG: 5 TABLET ORAL at 21:43

## 2025-06-05 RX ADMIN — ACETAMINOPHEN 975 MG: 325 TABLET ORAL at 23:10

## 2025-06-05 RX ADMIN — ACETAMINOPHEN 975 MG: 325 TABLET ORAL at 15:58

## 2025-06-05 RX ADMIN — MULTIVITAMIN TABLET 1 TABLET: TABLET at 08:14

## 2025-06-05 RX ADMIN — OXYCODONE 5 MG: 5 TABLET ORAL at 13:09

## 2025-06-05 ASSESSMENT — ACTIVITIES OF DAILY LIVING (ADL)
ADLS_ACUITY_SCORE: 33
ADLS_ACUITY_SCORE: 37
ADLS_ACUITY_SCORE: 40
ADLS_ACUITY_SCORE: 40
ADLS_ACUITY_SCORE: 33
ADLS_ACUITY_SCORE: 37
ADLS_ACUITY_SCORE: 33
ADLS_ACUITY_SCORE: 37
ADLS_ACUITY_SCORE: 40
ADLS_ACUITY_SCORE: 37
ADLS_ACUITY_SCORE: 37
ADLS_ACUITY_SCORE: 33
ADLS_ACUITY_SCORE: 33

## 2025-06-05 NOTE — PLAN OF CARE
Goal Outcome Evaluation:    Problem: Adult Inpatient Plan of Care  Goal: Optimal Comfort and Wellbeing  Intervention: Monitor Pain and Promote Comfort  Recent Flowsheet Documentation  Taken 6/4/2025 2340 by Marina Thomas RN  Pain Management Interventions: declines  Intervention: Provide Person-Centered Care  Recent Flowsheet Documentation  Taken 6/5/2025 0150 by Marina Thomas RN  Trust Relationship/Rapport:   choices provided   care explained    Patient vital signs are at baseline: Yes  Patient able to ambulate as they were prior to admission or with assist devices provided by therapies during their stay:  Yes, Ax1 w/gb  Patient MUST void prior to discharge:  Yes  Patient able to tolerate oral intake:  Yes  Pain has adequate pain control using Oral analgesics:  Yes  Does patient have an identified :  Yes  Has goal D/C date and time been discussed with patient:  Yes, pending on drainage output

## 2025-06-05 NOTE — PLAN OF CARE
Patient vital signs are at baseline: Yes  Patient able to ambulate as they were prior to admission or with assist devices provided by therapies during their stay:  Yes  Patient MUST void prior to discharge:  Yes  Patient able to tolerate oral intake:  Yes  Pain has adequate pain control using Oral analgesics:  Yes  Does patient have an identified :  Yes  Has goal D/C date and time been discussed with patient:  Yes    Pain controlled with scheduled Tylenol, prn Oxycodone and ice packs. Plan is to discharge home tomorrow after drain removal.

## 2025-06-05 NOTE — PROGRESS NOTES
"  Spine Surgery Progress Note    POD #: 2 Days Post-Op    Assessment:  S/P Procedure(s):  RIGHT LUMBAR 3-4 AND RIGHT LUMBAR 4-5 TRANSFORAMINAL LUMBAR INTERBODY FUSION WITH STEALTH NAVIGATION  Complete laminectomy at Lumbar 3-4 and Lumbar 4-5 on 6/3/2025    Plan:  - Continue PT/OT, appreciate recs  - Ambulate as tolerated  - Sidhu removed per protocol and has passed 3 PVRs  - Pain Management continue current regimen  - Anticoagulation: SCDs, johnathon stockings and early ambulation.  - Diet: progress diet as tolerated  - Labs: hgb 10.4 on 6/4/2025, transfuse if <7.0. 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 Caal's Team in 2 weeks  - Disposition: Patient plans to d/c to home pending HV drain output, medical clearance, pain control, and therapy recommendations     Subjective:    Appears comfortable. Reports pain only in incisional area, managed with pain regimen at this time. States PT and OT went well this morning.   Lower extremity pain improved compared with prior to surgery. Notes weakness into bilateral extremities prior to surgery which has improved.   No new extremity pain, numbness, tingling, or weakness to report  Nausea, Vomiting:  No  Lightheadedness, Dizziness:  No  Flatus: Yes  BM: No    Sidhu removed. Voiding independently without difficulty.   HV drain in place    Reports good support system when she eventually discharges.  All questions/concerns answered.    Objective:  /65 (BP Location: Left arm)   Pulse 77   Temp 97.7  F (36.5  C) (Oral)   Resp 18   Ht 1.753 m (5' 9\")   Wt 86.2 kg (190 lb)   SpO2 92%   BMI 28.06 kg/m        Incision covered with dressing. Gauze dressing in place at low back. Appears clean, dry, intact.  Awake, alert and oriented. Patient is sitting up at bedside. Conversational  Non-labored " "breathing    RLE   5/5 hip flexors  5/5 quadriceps  5/5 tibialis anterior  5/5 EHL  5/5 Gastroc Soleus  RLE L2-S1 intact to light touch    LLE   5/5 hip flexors  5/5 quadriceps  5/5 tibialis anterior  5/5 EHL  5/5 Gastroc Soleus  LLE L2-S1 intact to light touch    Bilateral calves non-tender, non-edematous, non-erythematous, no warmth    Drain in place, without signs of infection: output 30ml overnight      Labs:  Recent Labs   Lab Test 06/05/25  0716 06/04/25  0716   HGB 9.7* 10.4*   MCV 89 88     No lab results found.    Invalid input(s): \"SODIUM\", \"JP5EGUDT\", \"BUNCRERATIO\", \"CALCIUM\"      Clara Holcomb PA-C / Dr. Caal  Date: 06/05/2025  [facility]   "

## 2025-06-05 NOTE — PROGRESS NOTES
[facility]    Medicine Progress Note - Hospitalist Service    Date of Admission:  6/3/2025    Assessment & Plan   Stella Guidry is a 75 year old old female with a history of DM2, HTN, GERD, HLD, spine disease underwent lumbar spine surgery. [facility] service was asked to evaluate patient for postoperative medical management. Please resume the home medications as reconciled and further noted below with ordered hold parameters.  Vital signs have been stable post-operatively including hemodynamically stable blood pressure and heart rate. Thank you for this consult; we will continue to follow this patient until discharge.    Per report and review of EMR, patient has drain being managed by Ortho, patient's pain remains well-tolerated, patient is up and ambulating, patient has normal oral intake with usual eating and drinking without issue, clinical status is unchanged, and there are no interval changes. Chart check. No medical objections to discharge at the discretion of the primary surgery team. Please page and update [facility] with any clinical status changes or questions.     HTN  -Order home medications with the written tiered hold parameters given risk for post-operative hypotension.      DM2  -Hold home oral anti-glycemics post-op given risk for acute hypoglycemia with sulfonylureas and nephrotoxicity if contrast is utilized in any emergent imaging with biguanides; utilize insulin corrections three times a day and at bedtime. Restart home oral anti-glycemics step-wise as tolerated once patient is eating as per their normal, usual diet.  -Carb-controlled/diabetic diet.      HLD  -Order home statin.    GERD  -Order home antacids/medications. Utilize formulary while inpatient.     S/p Lumbar Surgery  Spine Disease  Acute Post-Op Pain  -Agree with current pain control regimen; consider acute pain team consultation if increasingly difficult to control or proves refractory.   -PT evaluation.   -OT  "evaluation.  -IS frequently, initially every hour while awake as tolerated. Directly encouraged and discussed.      Post-Op DVT Prophylaxis  -As per primary surgery team.      Procedure(s):  RIGHT LUMBAR 3-4 AND RIGHT LUMBAR 4-5 TRANSFORAMINAL LUMBAR INTERBODY FUSION WITH LAMINECTOMY WITH STEALTH NAVIGATION  Post-operative Day: 1 Day Post-Op  Code status:Full Code     Estimated Blood Loss:  200 mL    Hospital Problem List   No problem-specific Assessment & Plan notes found for this encounter.    Active Problems:    Essential hypertension    Type 2 diabetes mellitus without complication, without long-term current use of insulin (H)    Dyslipidemia    Status post lumbar spinal fusion      -Reviewed the patient's preoperative H and P and updated missing elements.  -Home medication reconciliation has been reviewed.  Medications have been ordered as noted from the home list and changes are documented above           Diet: Advance Diet as Tolerated: Regular Diet Adult  Discharge Instruction - Regular Diet Adult    DVT Prophylaxis: Defer to primary service  Sidhu Catheter: Not present  Lines: None     Cardiac Monitoring: None  Code Status: Full Code      Clinically Significant Risk Factors                   # Hypertension: Noted on problem list            # Overweight: Estimated body mass index is 28.06 kg/m  as calculated from the following:    Height as of this encounter: 1.753 m (5' 9\").    Weight as of this encounter: 86.2 kg (190 lb)., PRESENT ON ADMISSION            Social Drivers of Health    Tobacco Use: Medium Risk (6/3/2025)    Patient History     Smoking Tobacco Use: Former     Smokeless Tobacco Use: Never          Disposition Plan     Medically Ready for Discharge: Anticipated Today             Christopher Unger MD  Hospitalist Service  [facility]  Securely message with [employer] (more info)  Text page via [employer] Paging/Directory "   ______________________________________________________________________    Interval History   No acute events overnight.    No report of chest pain, shortness of breath, or other new complaints. Still has drain and that is monitored/managed by Ortho.    Physical Exam   Vital Signs: Temp: 97.8  F (36.6  C) Temp src: Oral BP: 114/55 Pulse: 78   Resp: 18 SpO2: 98 % O2 Device: None (Room air)    Weight: 190 lbs 0 oz        Medical Decision Making       25 MINUTES SPENT BY ME on the date of service doing chart review, history, exam, documentation & further activities per the note.      Data         Imaging results reviewed over the past 24 hrs:   No results found for this or any previous visit (from the past 24 hours).

## 2025-06-05 NOTE — PLAN OF CARE
Problem: Adult Inpatient Plan of Care  Goal: Absence of Hospital-Acquired Illness or Injury  Intervention: Identify and Manage Fall Risk  Recent Flowsheet Documentation  Taken 6/4/2025 2100 by Adilene Goyal RN  Safety Promotion/Fall Prevention:   activity supervised   clutter free environment maintained   increased rounding and observation   lighting adjusted   mobility aid in reach   nonskid shoes/slippers when out of bed   patient and family education   room door open   room near nurse's station   safety round/check completed     Patient vital signs are at baseline: Yes  Patient able to ambulate as they were prior to admission or with assist devices provided by therapies during their stay:  Yes  Patient MUST void prior to discharge:  Yes  Patient able to tolerate oral intake:  Yes  Pain has adequate pain control using Oral analgesics:  Yes  Does patient have an identified :  Yes  Has goal D/C date and time been discussed with patient:  Yes    Pt A&Ox4. VSS on RA. CMS intact except baseline numbness to LLE. Dressing c/d/i. Voiding adequately. Up with assist of 1 gb and walker while ambulating. Pain managed with PRN oxycodone 5 mg and scheduled gabapentin. IV SL. Monitoring drain output.

## 2025-06-06 VITALS
HEART RATE: 69 BPM | RESPIRATION RATE: 18 BRPM | SYSTOLIC BLOOD PRESSURE: 146 MMHG | HEIGHT: 69 IN | WEIGHT: 190 LBS | TEMPERATURE: 97.6 F | DIASTOLIC BLOOD PRESSURE: 72 MMHG | OXYGEN SATURATION: 92 % | BODY MASS INDEX: 28.14 KG/M2

## 2025-06-06 LAB — GLUCOSE BLDC GLUCOMTR-MCNC: 120 MG/DL (ref 70–99)

## 2025-06-06 PROCEDURE — 250N000013 HC RX MED GY IP 250 OP 250 PS 637: Performed by: ORTHOPAEDIC SURGERY

## 2025-06-06 PROCEDURE — 250N000013 HC RX MED GY IP 250 OP 250 PS 637: Performed by: HOSPITALIST

## 2025-06-06 PROCEDURE — 250N000013 HC RX MED GY IP 250 OP 250 PS 637

## 2025-06-06 PROCEDURE — 99231 SBSQ HOSP IP/OBS SF/LOW 25: CPT | Performed by: HOSPITALIST

## 2025-06-06 RX ORDER — ONDANSETRON 4 MG/1
4 TABLET, ORALLY DISINTEGRATING ORAL EVERY 6 HOURS PRN
Qty: 6 TABLET | Refills: 0 | Status: SHIPPED | OUTPATIENT
Start: 2025-06-06

## 2025-06-06 RX ADMIN — PANTOPRAZOLE SODIUM 40 MG: 40 TABLET, DELAYED RELEASE ORAL at 06:17

## 2025-06-06 RX ADMIN — SENNOSIDES AND DOCUSATE SODIUM 1 TABLET: 50; 8.6 TABLET ORAL at 10:18

## 2025-06-06 RX ADMIN — MULTIVITAMIN TABLET 1 TABLET: TABLET at 10:18

## 2025-06-06 RX ADMIN — CALCIUM 500 MG: 500 TABLET ORAL at 10:18

## 2025-06-06 RX ADMIN — LOSARTAN POTASSIUM AND HYDROCHLOROTHIAZIDE 1 TABLET: 50; 12.5 TABLET, FILM COATED ORAL at 10:26

## 2025-06-06 RX ADMIN — POLYETHYLENE GLYCOL 3350 17 G: 17 POWDER, FOR SOLUTION ORAL at 10:18

## 2025-06-06 RX ADMIN — OXYCODONE 5 MG: 5 TABLET ORAL at 05:20

## 2025-06-06 RX ADMIN — ACETAMINOPHEN 975 MG: 325 TABLET ORAL at 06:17

## 2025-06-06 RX ADMIN — Medication 25 MCG: at 10:18

## 2025-06-06 RX ADMIN — OXYCODONE 5 MG: 5 TABLET ORAL at 10:26

## 2025-06-06 ASSESSMENT — ACTIVITIES OF DAILY LIVING (ADL)
ADLS_ACUITY_SCORE: 41
ADLS_ACUITY_SCORE: 37
ADLS_ACUITY_SCORE: 37
ADLS_ACUITY_SCORE: 41
ADLS_ACUITY_SCORE: 41
ADLS_ACUITY_SCORE: 40
ADLS_ACUITY_SCORE: 37

## 2025-06-06 NOTE — PLAN OF CARE
Problem: Spinal Surgery  Goal: Absence of Infection Signs and Symptoms  Outcome: Progressing  Intervention: Prevent or Manage Infection  Recent Flowsheet Documentation  Taken 6/5/2025 2125 by Geneva Ruiz RN  Infection Prevention:   hand hygiene promoted   rest/sleep promoted   single patient room provided     Problem: Spinal Surgery  Goal: Optimal Pain Control and Function  Outcome: Progressing  Intervention: Prevent or Manage Pain  Recent Flowsheet Documentation  Taken 6/5/2025 2125 by Geneva Ruiz RN  Pain Management Interventions: medication (see MAR)     Goal Outcome Evaluation:  Note from [id number]. VSS on RA. Denies shortness of breath. A&Ox4. Pt rated pain 6/10 in low back, 10mg oxy given & effective. Pt developed 3 blisters on left hip from friction of her brief lining, mepilex placed. Dressing on low back is CDI. Full sensation per pt. SBA with walker for transferring. Hemovac with 30mL output. L PIV SL. Voiding adequately. Education of medication administration and use of call-light to reduce risk for falls and injury. No further issues noted.

## 2025-06-06 NOTE — PROGRESS NOTES
[facility]    Medicine Progress Note - Hospitalist Service    Date of Admission:  6/3/2025    Assessment & Plan   Stella Guidry is a 75 year old old female with a history of DM2, HTN, GERD, HLD, spine disease underwent lumbar spine surgery. [facility] service was asked to evaluate patient for postoperative medical management. Please resume the home medications as reconciled and further noted below with ordered hold parameters.  Vital signs have been stable post-operatively including hemodynamically stable blood pressure and heart rate. Thank you for this consult; we will continue to follow this patient until discharge.    Per report and review of EMR, patient has drain being managed by Ortho, patient's pain remains well-tolerated, patient is up and ambulating, patient has normal oral intake with usual eating and drinking without issue, clinical status is unchanged, and there are no interval changes. Following peripherally - chart check. No medical objections to discharge at the discretion of the primary surgery team. Please freely page and update [facility] with any clinical status changes or new questions.     HTN  -Order home medications with the written tiered hold parameters given risk for post-operative hypotension.      DM2  -Hold home oral anti-glycemics post-op given risk for acute hypoglycemia with sulfonylureas and nephrotoxicity if contrast is utilized in any emergent imaging with biguanides; utilize insulin corrections three times a day and at bedtime. Restart home oral anti-glycemics step-wise as tolerated once patient is eating as per their normal, usual diet.  -Carb-controlled/diabetic diet.      HLD  -Order home statin.    GERD  -Order home antacids/medications. Utilize formulary while inpatient.     S/p Lumbar Surgery  Spine Disease  Acute Post-Op Pain  -Agree with current pain control regimen; consider acute pain team consultation if increasingly difficult to control or proves  "refractory.   -PT evaluation.   -OT evaluation.  -IS frequently, initially every hour while awake as tolerated. Directly encouraged and discussed.      Post-Op DVT Prophylaxis  -As per primary surgery team.      Procedure(s):  RIGHT LUMBAR 3-4 AND RIGHT LUMBAR 4-5 TRANSFORAMINAL LUMBAR INTERBODY FUSION WITH LAMINECTOMY WITH STEALTH NAVIGATION  Post-operative Day: 1 Day Post-Op  Code status:Full Code     Estimated Blood Loss:  200 mL    Hospital Problem List   No problem-specific Assessment & Plan notes found for this encounter.    Active Problems:    Essential hypertension    Type 2 diabetes mellitus without complication, without long-term current use of insulin (H)    Dyslipidemia    Status post lumbar spinal fusion      -Reviewed the patient's preoperative H and P and updated missing elements.  -Home medication reconciliation has been reviewed.  Medications have been ordered as noted from the home list and changes are documented above           Diet: Discharge Instruction - Regular Diet Adult  Advance Diet as Tolerated: Regular Diet Adult  Discharge Instruction - Regular Diet Adult    DVT Prophylaxis: Defer to primary service  Sidhu Catheter: Not present  Lines: None     Cardiac Monitoring: None  Code Status: Full Code      Clinically Significant Risk Factors                   # Hypertension: Noted on problem list            # Overweight: Estimated body mass index is 28.06 kg/m  as calculated from the following:    Height as of this encounter: 1.753 m (5' 9\").    Weight as of this encounter: 86.2 kg (190 lb)., PRESENT ON ADMISSION            Social Drivers of Health    Tobacco Use: Medium Risk (6/3/2025)    Patient History     Smoking Tobacco Use: Former     Smokeless Tobacco Use: Never          Disposition Plan     Medically Ready for Discharge: Ready Now             Christopher Unger MD  Hospitalist Service  [facility]  Securely message with [employer] (more info)  Text page via [email or web address] " Paging/Directory   ______________________________________________________________________    Interval History   No acute events overnight.    No report of chest pain, shortness of breath, or other new complaints. Remains with drain and that is monitored/managed by Ortho.    Physical Exam   Vital Signs: Temp: 97.6  F (36.4  C) Temp src: Oral BP: (!) 146/72 Pulse: 69   Resp: 18 SpO2: 92 % O2 Device: None (Room air)    Weight: 190 lbs 0 oz        Medical Decision Making       26 MINUTES SPENT BY ME on the date of service doing chart review, history, exam, documentation & further activities per the note.      Data         Imaging results reviewed over the past 24 hrs:   No results found for this or any previous visit (from the past 24 hours).

## 2025-06-06 NOTE — DISCHARGE SUMMARY
ORTHOPEDIC DISCHARGE SUMMARY       Stella Guidry,  1950, MRN [id number]    Admission Date: 6/3/2025      Admission Diagnoses: Spinal stenosis, lumbar [M48.061]  Status post lumbar spinal fusion [Z98.1]     Discharge Date: 2025    Post-operative Day:  3 Days Post-Op    Reason for Admission: The patient was admitted for the following: Procedure(s):  RIGHT LUMBAR 3-4 AND RIGHT LUMBAR 4-5 TRANSFORAMINAL LUMBAR INTERBODY FUSION WITH STEALTH NAVIGATION  Complete laminectomy at Lumbar 3-4 and Lumbar 4-5    BRIEF HOSPITAL COURSE   Stella Guidry is a pleasant 75 year old female who underwent the aforementioned procedure with Dr. Caal on 6/3/2025. There were no intraoperative complications and the patient was transferred to the recovery room and later the orthopedic unit in stable condition. Once the patient reached the orthopedic floor our orthopedic pain protocol was implemented along with the following:    Anticoagulation Medications: None, may resume PTA aspirin on POD #5  Therapy: PT and OT  Activity: WBAT  Bracing: LSO brace for comfort    Consultations during Admission: Hospitalist service for medical management     COMPLICATIONS/SIGNIFICANT FINDINGS    None    DISCHARGE INFORMATION   Condition at discharge: Good  Discharge destination: Home  Patient was seen by myself on the date of discharge.    FOLLOW UP CARE   Follow up with orthopedics in 2 weeks or sooner should the need arise. Ortho will continue to manage pain control, post op anticoagulation and incision care.     Follow up with your PCP for management of chronic medical problems and to evaluate for post op medical complications including constipation, nausea/vomiting, DVT/PE, anemia, changes in blood pressure, fevers/chills, urinary retention and atelectasis/pneumonia.     Subjective   Appears comfortable. Reports pain incisional pain well managed with oral oxycodone. Lower extremity pain improved compared with prior to surgery. Notes  "weakness into bilateral extremities prior to surgery which has improved.   No new extremity pain, numbness, tingling, or weakness to report  Nausea, Vomiting:  Occasional nausea after meals, no vomiting  Lightheadedness, Dizziness:  No  Flatus: Yes  BM: No     Sidhu removed. Voiding independently without difficulty.   HV drain removed     Reports good support system when she eventually discharges.  All questions/concerns answered.    Physical Exam   BP (!) 146/72 (BP Location: Right arm, Patient Position: Semi-Javed's, Cuff Size: Adult Regular)   Pulse 69   Temp 97.6  F (36.4  C) (Oral)   Resp 18   Ht 1.753 m (5' 9\")   Wt 86.2 kg (190 lb)   SpO2 92%   BMI 28.06 kg/m    Incision covered with dressing. Gauze dressing in place at low back. Appears clean, dry, intact.  Awake, alert and oriented. Patient is sitting up at bedside. Conversational  Non-labored breathing     RLE   5/5 hip flexors  5/5 quadriceps  5/5 tibialis anterior  5/5 EHL  5/5 Gastroc Soleus  RLE L2-S1 intact to light touch     LLE   5/5 hip flexors  5/5 quadriceps  5/5 tibialis anterior  5/5 EHL  5/5 Gastroc Soleus  LLE L2-S1 intact to light touch     Bilateral calves non-tender, non-edematous, non-erythematous, no warmth     Drain without signs of infection: output 30ml overnight. Drain removed.     Pertinent Results at Discharge     Hemoglobin   Date/Time Value Ref Range Status   06/05/2025 07:16 AM 9.7 (L) 11.7 - 15.7 g/dL Final   06/04/2025 07:16 AM 10.4 (L) 11.7 - 15.7 g/dL Final   02/27/2019 05:46 AM 10.3 (L) 12.0 - 16.0 g/dL Final       Problem List   Active Problems:    Essential hypertension    Type 2 diabetes mellitus without complication, without long-term current use of insulin (H)    Dyslipidemia    Status post lumbar spinal fusion      Beth Romo PA-C/Dr. Caal  [facility]  [phone]  Date: 6/6/2025  Time: 9:51 AM   "

## 2025-06-06 NOTE — PROGRESS NOTES
"  Spine Surgery Progress Note    POD #: 3 Days Post-Op    Assessment:  S/P Procedure(s):  RIGHT LUMBAR 3-4 AND RIGHT LUMBAR 4-5 TRANSFORAMINAL LUMBAR INTERBODY FUSION WITH STEALTH NAVIGATION  Complete laminectomy at Lumbar 3-4 and Lumbar 4-5 on 6/3/2025    Plan:  - Continue PT/OT, appreciate recs  - Ambulate as tolerated  - Sidhu removed per protocol and has passed 3 PVRs  - Pain Management continue current regimen  - Anticoagulation: SCDs, johnathon stockings and early ambulation.  - Diet: progress diet as tolerated  - Labs: hgb 9.7 on 6/5/2025, transfuse if <7.0. 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 Drain to be removed today.  - Follow-up: Outpatient follow up with Dr. Caal's Team in 2 weeks  - Disposition: Patient plans to d/c to home today pending HV drain removal    Subjective:    Appears comfortable. Reports pain incisional pain well managed with oral oxycodone. Lower extremity pain improved compared with prior to surgery. Notes weakness into bilateral extremities prior to surgery which has improved.   No new extremity pain, numbness, tingling, or weakness to report  Nausea, Vomiting:  Occasional nausea after meals, no vomiting  Lightheadedness, Dizziness:  No  Flatus: Yes  BM: No    Sidhu removed. Voiding independently without difficulty.   HV drain in place    Reports good support system when she eventually discharges.  All questions/concerns answered.    Objective:  BP (!) 146/72 (BP Location: Right arm, Patient Position: Semi-Javed's, Cuff Size: Adult Regular)   Pulse 69   Temp 97.6  F (36.4  C) (Oral)   Resp 18   Ht 1.753 m (5' 9\")   Wt 86.2 kg (190 lb)   SpO2 92%   BMI 28.06 kg/m        Incision covered with dressing. Gauze dressing in place at low back. Appears clean, dry, intact.  Awake, alert and oriented. Patient is sitting up at bedside. " "Conversational  Non-labored breathing    RLE   5/5 hip flexors  5/5 quadriceps  5/5 tibialis anterior  5/5 EHL  5/5 Gastroc Soleus  RLE L2-S1 intact to light touch    LLE   5/5 hip flexors  5/5 quadriceps  5/5 tibialis anterior  5/5 EHL  5/5 Gastroc Soleus  LLE L2-S1 intact to light touch    Bilateral calves non-tender, non-edematous, non-erythematous, no warmth    Drain in place, without signs of infection: output 30ml overnight      Labs:  Recent Labs   Lab Test 06/05/25  0716 06/04/25  0716   HGB 9.7* 10.4*   MCV 89 88     No lab results found.    Invalid input(s): \"SODIUM\", \"[id number]\", \"BUNCRERATIO\", \"CALCIUM\"      Beth Romo PA-C  / Dr. Caal  Date: 06/06/2025  [facility]   "

## 2025-06-23 ENCOUNTER — DOCUMENTATION ONLY (OUTPATIENT)
Dept: OTHER | Facility: CLINIC | Age: 75
End: 2025-06-23
Payer: MEDICARE

## (undated) DEVICE — GLOVE BIOGEL INDICATOR 7.5 LF 41675

## (undated) DEVICE — SUTURE MONOCRYL 3-0 18 PS2 UND MCP497G

## (undated) DEVICE — SOL WATER IRRIG 1000ML BOTTLE 2F7114

## (undated) DEVICE — SU ETHIBOND 1 CT-1 30" X425H

## (undated) DEVICE — SUCTION MANIFOLD NEPTUNE 2 SYS 4 PORT 0702-020-000

## (undated) DEVICE — GOWN IMPERVIOUS BREATHABLE SMART LG 89015

## (undated) DEVICE — NEEDLE SUTURE ANCHOR 1824-4DC

## (undated) DEVICE — DRAPE TOWEL IMPERVIOUS X2 7553

## (undated) DEVICE — PACK 9X6IN THRP HOT COLD CMPR  MED GEL 80104

## (undated) DEVICE — ELECTRODE PATIENT RETURN ADULT L10 FT 2 PLATE CORD 0855C

## (undated) DEVICE — SPONGE RAY-TEC 4X8" 7318

## (undated) DEVICE — ADH SKIN CLOSURE PREMIERPRO EXOFIN 1.0ML 3470

## (undated) DEVICE — CATH IV 14GA 2IN REM FLASHPLUG DEHP-FR PVC FR 4251717-02

## (undated) DEVICE — GLOVE UNDER INDICATOR PI SZ 8.5 LF 41685

## (undated) DEVICE — BLADE SAGITTAL WIDE (SO-618) 2108-118

## (undated) DEVICE — GOWN XLG DISP 9545

## (undated) DEVICE — DRAPE STERI TOWEL LG 1010

## (undated) DEVICE — SUTURE VICRYL+ 1 27IN CT-2 VLT VCP335H

## (undated) DEVICE — SU DERMABOND ADVANCED .7ML DNX12

## (undated) DEVICE — SU MONOCRYL 3-0 PS-2 18" UND MCP497G

## (undated) DEVICE — CATH TRAY FOLEY SURESTEP 16FR DRAIN BAG STATOCK A899916

## (undated) DEVICE — KIT DRAIN CLOSED WOUND SUCTION MED 400ML RESVR

## (undated) DEVICE — SOL NACL 0.9% IRRIG 1000ML BOTTLE 2F7124

## (undated) DEVICE — GLOVE UNDER INDICATOR PI SZ 7.0 LF 41670

## (undated) DEVICE — GLOVE BIOGEL PI SZ 8.0 40880

## (undated) DEVICE — SOL NACL 0.9% INJ 1000ML BAG 2B1324X

## (undated) DEVICE — MARKER SPHERES PASSIVE MEDT PACK 5 8801075

## (undated) DEVICE — SYR BULB IRRIG DOVER 60 ML LATEX FREE 67000

## (undated) DEVICE — WRAP B-COOL TERI LUMBAR 08143380

## (undated) DEVICE — POSITIONER ARM CRADLE LAMINECTOMY DISP

## (undated) DEVICE — SUTURE VICRYL+ 0 27IN CT-1 UND VCP260H

## (undated) DEVICE — GLOVE BIOGEL PI SZ 7.0 40870

## (undated) DEVICE — CLEANSER JET LAVAGE IRRISEPT 0.05% CHG IRRISEPT45USA

## (undated) DEVICE — DRAPE O ARM TUBE 9732722

## (undated) DEVICE — GLOVE BIOGEL PI SZ 8.5 40885

## (undated) DEVICE — VIAL DECANTER STERILE WHITE DYNJDEC06

## (undated) DEVICE — CUSTOM PACK OPEN SHOULDER SOP5BOSHEB

## (undated) DEVICE — CUSTOM PACK LUMBAR FUSION SNE5BLFHEA

## (undated) DEVICE — Device

## (undated) DEVICE — SUCTION IRR SYSTEM W/O TIP INTERPULSE HANDPIECE 0210-100-000

## (undated) DEVICE — HOLDER LIMB VELCRO OR 0814-1533

## (undated) DEVICE — PSTNR FACE LRG F/PRONE PSTN 10/CA PFC-100

## (undated) DEVICE — SYR 50ML CATH TIP W/O NDL 309620

## (undated) DEVICE — BLADE BONE MILL STRK MED 5420-MED-000

## (undated) DEVICE — BONE CLEANING TIP INTERPULSE  0210-010-000

## (undated) DEVICE — DRAPE BACK TABLE PADDED 60X90

## (undated) DEVICE — TOOL DISSECT MIDAS MR8 14CM MATCH HEAD 3MM MR8-14MH30

## (undated) DEVICE — GLOVE SURG PI ULTRA TOUCH M SZ 7 LF 42670

## (undated) DEVICE — SUTURE VICRYL+ 2-0 CT-2 27" UND VCP269H

## (undated) DEVICE — GUIDE PIN STERILE 3X75MM

## (undated) DEVICE — DRSG BIOPATCH GERMICIDAL SPLIT SPONGE 4MM MED 4150

## (undated) DEVICE — RX SURGIFLO HEMOSTATIC MATRIX 8ML 2991

## (undated) DEVICE — PACK MINOR SINGLE BASIN SSK3001

## (undated) DEVICE — SU FIBERWIRE 2 38" T-8 NDL  AR-7206

## (undated) DEVICE — PLATE GROUNDING ADULT W/CORD 9165L

## (undated) DEVICE — GLOVE BIOGEL PI INDICATOR 8.0 LF 41680

## (undated) DEVICE — ESU PENCIL SMOKE EVAC W/ROCKER SWITCH 0703-047-000

## (undated) DEVICE — DRSG AQUACEL AG HYDROFIBER  3.5X10" 422605

## (undated) DEVICE — SUTURE VICRYL+ 1 27IN CT-1 UND VCP261H

## (undated) DEVICE — MAT FLOOR SURGICAL 40X38 0702140238

## (undated) DEVICE — SU STRATAFIX PDS PLUS 1 CT-1 18" SXPP1A404

## (undated) DEVICE — SUTURE VICRYL+ 2-0 27IN CT-1 UND VCP259H

## (undated) DEVICE — ESU ELEC BLADE 2.75" COATED/INSULATED E1455

## (undated) RX ORDER — LIDOCAINE HYDROCHLORIDE 10 MG/ML
INJECTION, SOLUTION EPIDURAL; INFILTRATION; INTRACAUDAL; PERINEURAL
Status: DISPENSED
Start: 2025-06-03

## (undated) RX ORDER — CEFAZOLIN SODIUM 1 G/3ML
INJECTION, POWDER, FOR SOLUTION INTRAMUSCULAR; INTRAVENOUS
Status: DISPENSED
Start: 2025-06-03

## (undated) RX ORDER — FENTANYL CITRATE 50 UG/ML
INJECTION, SOLUTION INTRAMUSCULAR; INTRAVENOUS
Status: DISPENSED
Start: 2025-06-03

## (undated) RX ORDER — PHENYLEPHRINE HYDROCHLORIDE 10 MG/ML
INJECTION INTRAVENOUS
Status: DISPENSED
Start: 2025-06-03

## (undated) RX ORDER — PROPOFOL 10 MG/ML
INJECTION, EMULSION INTRAVENOUS
Status: DISPENSED
Start: 2025-06-03

## (undated) RX ORDER — ONDANSETRON 2 MG/ML
INJECTION INTRAMUSCULAR; INTRAVENOUS
Status: DISPENSED
Start: 2025-06-03

## (undated) RX ORDER — EPHEDRINE SULFATE 50 MG/ML
INJECTION, SOLUTION INTRAMUSCULAR; INTRAVENOUS; SUBCUTANEOUS
Status: DISPENSED
Start: 2025-06-03

## (undated) RX ORDER — DEXAMETHASONE SODIUM PHOSPHATE 10 MG/ML
INJECTION, EMULSION INTRAMUSCULAR; INTRAVENOUS
Status: DISPENSED
Start: 2025-06-03